# Patient Record
Sex: FEMALE | Race: WHITE | Employment: FULL TIME | URBAN - METROPOLITAN AREA
[De-identification: names, ages, dates, MRNs, and addresses within clinical notes are randomized per-mention and may not be internally consistent; named-entity substitution may affect disease eponyms.]

---

## 2023-03-01 PROBLEM — E66.811 CLASS 1 OBESITY WITH BODY MASS INDEX (BMI) OF 34.0 TO 34.9 IN ADULT: Status: ACTIVE | Noted: 2023-03-01

## 2023-03-01 PROBLEM — F32.A ANXIETY AND DEPRESSION: Status: ACTIVE | Noted: 2023-03-01

## 2023-03-01 PROBLEM — E66.9 OBESITY: Status: ACTIVE | Noted: 2023-03-01

## 2023-03-01 PROBLEM — R06.83 SNORING: Status: ACTIVE | Noted: 2023-03-01

## 2023-03-01 PROBLEM — G47.30 MILD SLEEP APNEA: Status: ACTIVE | Noted: 2023-03-01

## 2023-03-01 PROBLEM — F41.9 ANXIETY AND DEPRESSION: Status: ACTIVE | Noted: 2023-03-01

## 2023-03-01 PROBLEM — R11.0 NAUSEA IN ADULT: Status: ACTIVE | Noted: 2023-03-01

## 2023-03-01 RX ORDER — FLUOXETINE HYDROCHLORIDE 20 MG/1
3 CAPSULE ORAL DAILY
COMMUNITY
Start: 2021-03-03 | End: 2023-10-13

## 2023-03-01 RX ORDER — LEVONORGESTREL 52 MG/1
INTRAUTERINE DEVICE INTRAUTERINE
COMMUNITY

## 2023-03-01 RX ORDER — BUPROPION HYDROCHLORIDE 300 MG/1
1 TABLET ORAL DAILY
COMMUNITY
Start: 2021-03-03 | End: 2023-10-13

## 2023-03-01 RX ORDER — ONDANSETRON 8 MG/1
8 TABLET, ORALLY DISINTEGRATING ORAL 3 TIMES DAILY PRN
COMMUNITY
Start: 2022-11-15 | End: 2024-03-04 | Stop reason: ALTCHOICE

## 2023-03-01 RX ORDER — SEMAGLUTIDE 1.34 MG/ML
1 INJECTION, SOLUTION SUBCUTANEOUS
COMMUNITY
End: 2023-04-10 | Stop reason: SDUPTHER

## 2023-03-31 ENCOUNTER — OFFICE VISIT (OUTPATIENT)
Dept: PRIMARY CARE | Facility: CLINIC | Age: 28
End: 2023-03-31
Payer: COMMERCIAL

## 2023-03-31 VITALS — HEIGHT: 68 IN | BODY MASS INDEX: 32.86 KG/M2 | WEIGHT: 216.8 LBS

## 2023-03-31 DIAGNOSIS — F41.9 ANXIETY AND DEPRESSION: Primary | Chronic | ICD-10-CM

## 2023-03-31 DIAGNOSIS — F32.A ANXIETY AND DEPRESSION: Primary | Chronic | ICD-10-CM

## 2023-03-31 PROBLEM — R06.83 SNORING: Status: RESOLVED | Noted: 2023-03-01 | Resolved: 2023-03-31

## 2023-03-31 PROCEDURE — 99213 OFFICE O/P EST LOW 20 MIN: CPT | Performed by: NURSE PRACTITIONER

## 2023-03-31 PROCEDURE — 1036F TOBACCO NON-USER: CPT | Performed by: NURSE PRACTITIONER

## 2023-03-31 PROCEDURE — 3008F BODY MASS INDEX DOCD: CPT | Performed by: NURSE PRACTITIONER

## 2023-03-31 RX ORDER — DOCUSATE SODIUM 100 MG/1
CAPSULE, LIQUID FILLED ORAL
COMMUNITY
Start: 2023-03-17

## 2023-03-31 ASSESSMENT — PAIN SCALES - GENERAL: PAINLEVEL: 0-NO PAIN

## 2023-03-31 ASSESSMENT — PATIENT HEALTH QUESTIONNAIRE - PHQ9
2. FEELING DOWN, DEPRESSED OR HOPELESS: NOT AT ALL
SUM OF ALL RESPONSES TO PHQ9 QUESTIONS 1 AND 2: 0
1. LITTLE INTEREST OR PLEASURE IN DOING THINGS: NOT AT ALL

## 2023-03-31 NOTE — PROGRESS NOTES
"Subjective   Patient ID: Lo Daniels is a 28 y.o. female who presents for Follow-up (Ozempic followup).    Presents for weight check and follow up.  Has been tolerating Ozempic 1mg weekly well. Only occasional nausea. Zofran helps when this occurs.  Is happy with gradual weight loss.  Has noticed the loss of the \"what will I eat next\" thoughts.    Mood has been good. Has been doing better with taking fluoxetine and bupropion daily.          Review of Systems    Objective   Ht 1.727 m (5' 8\")   Wt 98.3 kg (216 lb 12.8 oz)   BMI 32.96 kg/m²     Physical Exam  Vitals and nursing note reviewed.   Constitutional:       General: She is not in acute distress.     Appearance: Normal appearance.   Cardiovascular:      Rate and Rhythm: Normal rate and regular rhythm.      Heart sounds: Normal heart sounds. No murmur heard.  Pulmonary:      Effort: Pulmonary effort is normal. No respiratory distress.      Breath sounds: Normal breath sounds.   Psychiatric:         Mood and Affect: Mood normal.         Judgment: Judgment normal.         Assessment/Plan   Diagnoses and all orders for this visit:  Anxiety and depression  -     Follow Up In [facility] - PCP; Future  Adult BMI 32.0-32.9 kg/sq m         "

## 2023-03-31 NOTE — PATIENT INSTRUCTIONS
Ozempic working well, tolerating well.  Continue at 1mg weekly.  Continue as needed zofran for nausea.    Anxiety/depression: Continue fluoxetine daily.    Will reevaluate in 6 months at next visit.  Plan for PAP at that time.

## 2023-04-10 DIAGNOSIS — E66.09 CLASS 1 OBESITY DUE TO EXCESS CALORIES WITHOUT SERIOUS COMORBIDITY WITH BODY MASS INDEX (BMI) OF 34.0 TO 34.9 IN ADULT: Primary | ICD-10-CM

## 2023-04-10 RX ORDER — SEMAGLUTIDE 1.34 MG/ML
1 INJECTION, SOLUTION SUBCUTANEOUS
Qty: 3 ML | Refills: 5 | Status: SHIPPED | OUTPATIENT
Start: 2023-04-10 | End: 2023-11-13 | Stop reason: SDUPTHER

## 2023-09-28 ENCOUNTER — APPOINTMENT (OUTPATIENT)
Dept: PRIMARY CARE | Facility: CLINIC | Age: 28
End: 2023-09-28
Payer: COMMERCIAL

## 2023-10-11 ENCOUNTER — APPOINTMENT (OUTPATIENT)
Dept: PRIMARY CARE | Facility: CLINIC | Age: 28
End: 2023-10-11
Payer: COMMERCIAL

## 2023-10-11 NOTE — PROGRESS NOTES
Subjective   Lo Daniels is a 28 y.o. female here for a routine exam. Current complaints: ***. Personal health questionnaire reviewed: {yes/no:9010}.     Gynecologic History  No LMP recorded.  Contraception: {method:5051}  Last Pap: ***. Results were: {norm/abn:77211}  Last mammogram: NA. Results were:  NA    Obstetric History  OB History   No obstetric history on file.       Objective   Physical Exam  Vitals and nursing note reviewed.   Constitutional:       General: She is not in acute distress.     Appearance: Normal appearance. She is obese.   Cardiovascular:      Rate and Rhythm: Normal rate and regular rhythm.      Pulses: Normal pulses.      Heart sounds: Normal heart sounds. No murmur heard.  Pulmonary:      Effort: Pulmonary effort is normal. No respiratory distress.      Breath sounds: Normal breath sounds.   Genitourinary:     Exam position: Lithotomy position.      Pubic Area: No rash.       Vagina: Normal.      Cervix: Normal.      Uterus: Normal.       Adnexa: Right adnexa normal.        Left: Mass present.           Assessment/Plan   Healthy female exam.    {plan:19193}

## 2023-10-13 DIAGNOSIS — F41.9 ANXIETY AND DEPRESSION: Primary | ICD-10-CM

## 2023-10-13 DIAGNOSIS — F32.A ANXIETY AND DEPRESSION: Primary | ICD-10-CM

## 2023-10-13 RX ORDER — FLUOXETINE HYDROCHLORIDE 20 MG/1
60 CAPSULE ORAL DAILY
Qty: 270 CAPSULE | Refills: 0 | Status: SHIPPED | OUTPATIENT
Start: 2023-10-13 | End: 2024-01-15

## 2023-10-13 RX ORDER — BUPROPION HYDROCHLORIDE 300 MG/1
300 TABLET ORAL DAILY
Qty: 90 TABLET | Refills: 0 | Status: SHIPPED | OUTPATIENT
Start: 2023-10-13 | End: 2024-01-15

## 2023-11-12 ASSESSMENT — ENCOUNTER SYMPTOMS
MYALGIAS: 0
SHORTNESS OF BREATH: 0
DYSURIA: 0
FATIGUE: 0
COUGH: 0
NAUSEA: 0
HEADACHES: 0
VOMITING: 0
BLOOD IN STOOL: 0
ABDOMINAL PAIN: 0
CONSTIPATION: 0
APPETITE CHANGE: 0
DIARRHEA: 0
FREQUENCY: 0
BACK PAIN: 0
ARTHRALGIAS: 0
UNEXPECTED WEIGHT CHANGE: 0
PALPITATIONS: 0

## 2023-11-13 ENCOUNTER — LAB (OUTPATIENT)
Dept: LAB | Facility: LAB | Age: 28
End: 2023-11-13
Payer: COMMERCIAL

## 2023-11-13 ENCOUNTER — OFFICE VISIT (OUTPATIENT)
Dept: PRIMARY CARE | Facility: CLINIC | Age: 28
End: 2023-11-13
Payer: COMMERCIAL

## 2023-11-13 VITALS
BODY MASS INDEX: 32.49 KG/M2 | TEMPERATURE: 97.8 F | WEIGHT: 214.4 LBS | HEIGHT: 68 IN | OXYGEN SATURATION: 98 % | SYSTOLIC BLOOD PRESSURE: 110 MMHG | HEART RATE: 92 BPM | DIASTOLIC BLOOD PRESSURE: 68 MMHG

## 2023-11-13 DIAGNOSIS — Z12.4 PAP SMEAR FOR CERVICAL CANCER SCREENING: ICD-10-CM

## 2023-11-13 DIAGNOSIS — Z11.3 SCREEN FOR STD (SEXUALLY TRANSMITTED DISEASE): ICD-10-CM

## 2023-11-13 DIAGNOSIS — F41.9 ANXIETY AND DEPRESSION: Chronic | ICD-10-CM

## 2023-11-13 DIAGNOSIS — Z01.419 ENCOUNTER FOR GYNECOLOGICAL EXAMINATION WITHOUT ABNORMAL FINDING: Primary | ICD-10-CM

## 2023-11-13 DIAGNOSIS — E66.09 CLASS 1 OBESITY DUE TO EXCESS CALORIES WITHOUT SERIOUS COMORBIDITY WITH BODY MASS INDEX (BMI) OF 32.0 TO 32.9 IN ADULT: ICD-10-CM

## 2023-11-13 DIAGNOSIS — F32.A ANXIETY AND DEPRESSION: Chronic | ICD-10-CM

## 2023-11-13 LAB
ANION GAP SERPL CALC-SCNC: 12 MMOL/L (ref 10–20)
BUN SERPL-MCNC: 8 MG/DL (ref 6–23)
CALCIUM SERPL-MCNC: 9.2 MG/DL (ref 8.6–10.6)
CHLORIDE SERPL-SCNC: 104 MMOL/L (ref 98–107)
CHOLEST SERPL-MCNC: 161 MG/DL (ref 0–199)
CHOLESTEROL/HDL RATIO: 2.4
CO2 SERPL-SCNC: 26 MMOL/L (ref 21–32)
CREAT SERPL-MCNC: 0.78 MG/DL (ref 0.5–1.05)
EST. AVERAGE GLUCOSE BLD GHB EST-MCNC: 71 MG/DL
GFR SERPL CREATININE-BSD FRML MDRD: >90 ML/MIN/1.73M*2
GLUCOSE SERPL-MCNC: 80 MG/DL (ref 74–99)
HBA1C MFR BLD: 4.1 %
HDLC SERPL-MCNC: 68.1 MG/DL
HIV 1+2 AB+HIV1 P24 AG SERPL QL IA: NONREACTIVE
LDLC SERPL CALC-MCNC: 83 MG/DL
NON HDL CHOLESTEROL: 93 MG/DL (ref 0–149)
POTASSIUM SERPL-SCNC: 4.1 MMOL/L (ref 3.5–5.3)
SODIUM SERPL-SCNC: 138 MMOL/L (ref 136–145)
T PALLIDUM AB SER QL: NONREACTIVE
TRIGL SERPL-MCNC: 48 MG/DL (ref 0–149)
TSH SERPL-ACNC: 2.66 MIU/L (ref 0.44–3.98)
VLDL: 10 MG/DL (ref 0–40)

## 2023-11-13 PROCEDURE — 80048 BASIC METABOLIC PNL TOTAL CA: CPT

## 2023-11-13 PROCEDURE — 87624 HPV HI-RISK TYP POOLED RSLT: CPT

## 2023-11-13 PROCEDURE — 86780 TREPONEMA PALLIDUM: CPT

## 2023-11-13 PROCEDURE — 87661 TRICHOMONAS VAGINALIS AMPLIF: CPT

## 2023-11-13 PROCEDURE — 84443 ASSAY THYROID STIM HORMONE: CPT

## 2023-11-13 PROCEDURE — 3008F BODY MASS INDEX DOCD: CPT | Performed by: NURSE PRACTITIONER

## 2023-11-13 PROCEDURE — 99395 PREV VISIT EST AGE 18-39: CPT | Performed by: NURSE PRACTITIONER

## 2023-11-13 PROCEDURE — 80061 LIPID PANEL: CPT

## 2023-11-13 PROCEDURE — 83036 HEMOGLOBIN GLYCOSYLATED A1C: CPT

## 2023-11-13 PROCEDURE — 1036F TOBACCO NON-USER: CPT | Performed by: NURSE PRACTITIONER

## 2023-11-13 PROCEDURE — 36415 COLL VENOUS BLD VENIPUNCTURE: CPT

## 2023-11-13 PROCEDURE — 87800 DETECT AGNT MULT DNA DIREC: CPT

## 2023-11-13 PROCEDURE — 88175 CYTOPATH C/V AUTO FLUID REDO: CPT

## 2023-11-13 PROCEDURE — 87389 HIV-1 AG W/HIV-1&-2 AB AG IA: CPT

## 2023-11-13 RX ORDER — SEMAGLUTIDE 1.34 MG/ML
1 INJECTION, SOLUTION SUBCUTANEOUS
Qty: 3 ML | Refills: 5 | Status: SHIPPED | OUTPATIENT
Start: 2023-11-13 | End: 2024-03-04

## 2023-11-13 ASSESSMENT — ENCOUNTER SYMPTOMS
OCCASIONAL FEELINGS OF UNSTEADINESS: 0
DEPRESSION: 0
LOSS OF SENSATION IN FEET: 0

## 2023-11-13 ASSESSMENT — PATIENT HEALTH QUESTIONNAIRE - PHQ9
10. IF YOU CHECKED OFF ANY PROBLEMS, HOW DIFFICULT HAVE THESE PROBLEMS MADE IT FOR YOU TO DO YOUR WORK, TAKE CARE OF THINGS AT HOME, OR GET ALONG WITH OTHER PEOPLE: SOMEWHAT DIFFICULT
2. FEELING DOWN, DEPRESSED OR HOPELESS: SEVERAL DAYS
1. LITTLE INTEREST OR PLEASURE IN DOING THINGS: SEVERAL DAYS
SUM OF ALL RESPONSES TO PHQ9 QUESTIONS 1 AND 2: 2

## 2023-11-13 ASSESSMENT — PAIN SCALES - GENERAL: PAINLEVEL: 0-NO PAIN

## 2023-11-13 NOTE — PROGRESS NOTES
Subjective   Lo Daniels is a 28 y.o. female and is here for a comprehensive physical exam. The patient reports no problems.      History:  LMP: No LMP recorded (lmp unknown).  years ago after IUD  Menopause at NA years  Last pap date: years ago  Abnormal pap? no   : 0  Para:   STD screening asymptomatic.    Do you have pain that bothers you in your daily life? no    Review of Systems   Constitutional:  Negative for appetite change, fatigue and unexpected weight change.   HENT:  Negative for congestion, ear pain and hearing loss.    Eyes:  Negative for visual disturbance.   Respiratory:  Negative for cough and shortness of breath.    Cardiovascular:  Negative for chest pain, palpitations and leg swelling.   Gastrointestinal:  Negative for abdominal pain, blood in stool, constipation, diarrhea, nausea and vomiting.   Genitourinary:  Negative for dysuria, frequency and urgency.   Musculoskeletal:  Negative for arthralgias, back pain and myalgias.   Skin:  Negative for rash.   Neurological:  Negative for syncope and headaches.   Psychiatric/Behavioral:  The patient is nervous/anxious.         Objective   Physical Exam  Vitals and nursing note reviewed.   Constitutional:       General: She is not in acute distress.     Appearance: Normal appearance. She is not toxic-appearing.   HENT:      Head: Normocephalic.      Right Ear: Tympanic membrane, ear canal and external ear normal.      Left Ear: Tympanic membrane, ear canal and external ear normal.      Nose: Nose normal.      Mouth/Throat:      Mouth: Mucous membranes are dry.      Pharynx: Oropharynx is clear.   Eyes:      Conjunctiva/sclera: Conjunctivae normal.   Neck:      Thyroid: No thyromegaly.   Cardiovascular:      Rate and Rhythm: Normal rate and regular rhythm.      Pulses: Normal pulses.      Heart sounds: Normal heart sounds. No murmur heard.  Pulmonary:      Effort: Pulmonary effort is normal. No respiratory distress.      Breath sounds: Normal  breath sounds.   Abdominal:      General: Bowel sounds are normal.      Palpations: Abdomen is soft.      Tenderness: There is no abdominal tenderness.   Genitourinary:     General: Normal vulva.      Exam position: Lithotomy position.      Pubic Area: No rash.       Vagina: Normal. No vaginal discharge.      Cervix: Normal.      Uterus: Normal.       Adnexa: Right adnexa normal and left adnexa normal.   Musculoskeletal:         General: Normal range of motion.      Cervical back: Neck supple.      Right lower leg: No edema.      Left lower leg: No edema.   Lymphadenopathy:      Cervical: No cervical adenopathy.   Skin:     General: Skin is warm and dry.      Capillary Refill: Capillary refill takes less than 2 seconds.      Findings: No rash.   Neurological:      General: No focal deficit present.      Gait: Gait normal.   Psychiatric:         Mood and Affect: Mood normal.         Judgment: Judgment normal.         Assessment/Plan   Healthy female exam.      1. PAP smear today. If normal, repeat in 3-5 years  2. Patient Counseling:  --Nutrition: Stressed importance of moderation in sodium/caffeine intake, saturated fat and cholesterol, caloric balance, sufficient intake of fresh fruits, vegetables, fiber, calcium, iron, and 1 mg of folate supplement per day (for females capable of pregnancy).  --Discussed the issue of estrogen replacement, calcium supplement, and the daily use of baby aspirin.  --Exercise: Stressed the importance of regular exercise.   --Substance Abuse: Discussed cessation/primary prevention of tobacco, alcohol, or other drug use; driving or other dangerous activities under the influence; availability of treatment for abuse.    --Sexuality: Discussed sexually transmitted diseases, partner selection, use of condoms, avoidance of unintended pregnancy  and contraceptive alternatives.   --Injury prevention: Discussed safety belts, safety helmets, smoke detector, smoking near bedding or upholstery.    --Dental health: Discussed importance of regular tooth brushing, flossing, and dental visits.  --Immunizations reviewed.  --Discussed benefits of screening colonoscopy.  --After hours service discussed with patient  3. Discussed the patient's BMI with her.  The BMI is above average. The patient received Provided instructions on dietary changes  Provided instructions on exercise  Advised to Increase physical activity  Ozempic because they have an above normal BMI.  4. Follow up in 6 months

## 2023-11-13 NOTE — PATIENT INSTRUCTIONS
Fasting blood work today.    PAP smear in office today.  If normal, repeat in 3-5 years.    Continie Ozempic weekly.  Continue to work on weight loss with increasing aerobic exercise and following a healthy, portion controlled diet.

## 2023-11-16 LAB
C TRACH RRNA SPEC QL NAA+PROBE: NEGATIVE
N GONORRHOEA DNA SPEC QL PROBE+SIG AMP: NEGATIVE
T VAGINALIS RRNA SPEC QL NAA+PROBE: NEGATIVE

## 2023-12-01 ASSESSMENT — ENCOUNTER SYMPTOMS: NERVOUS/ANXIOUS: 1

## 2023-12-06 LAB
CYTOLOGY CMNT CVX/VAG CYTO-IMP: NORMAL
HPV HR 12 DNA GENITAL QL NAA+PROBE: NEGATIVE
HPV HR GENOTYPES PNL CVX NAA+PROBE: NEGATIVE
HPV16 DNA SPEC QL NAA+PROBE: NEGATIVE
HPV18 DNA SPEC QL NAA+PROBE: NEGATIVE
LAB AP CONTRACEPTIVE HISTORY: NORMAL
LAB AP HPV GENOTYPE QUESTION: YES
LAB AP HPV HR: NORMAL
LAB AP PAP ADDITIONAL TESTS: NORMAL
LABORATORY COMMENT REPORT: NORMAL
PATH REPORT.TOTAL CANCER: NORMAL

## 2023-12-11 DIAGNOSIS — Z01.419 ENCOUNTER FOR GYNECOLOGICAL EXAMINATION WITHOUT ABNORMAL FINDING: Primary | ICD-10-CM

## 2023-12-18 ENCOUNTER — CONSULT (OUTPATIENT)
Dept: ENDOCRINOLOGY | Facility: CLINIC | Age: 28
End: 2023-12-18
Payer: COMMERCIAL

## 2023-12-18 ENCOUNTER — LAB (OUTPATIENT)
Dept: LAB | Facility: LAB | Age: 28
End: 2023-12-18
Payer: COMMERCIAL

## 2023-12-18 VITALS
HEIGHT: 68 IN | SYSTOLIC BLOOD PRESSURE: 110 MMHG | BODY MASS INDEX: 33.49 KG/M2 | DIASTOLIC BLOOD PRESSURE: 71 MMHG | WEIGHT: 221 LBS | HEART RATE: 76 BPM

## 2023-12-18 DIAGNOSIS — N91.5 OLIGOMENORRHEA, UNSPECIFIED TYPE: ICD-10-CM

## 2023-12-18 DIAGNOSIS — Z01.419 ENCOUNTER FOR GYNECOLOGICAL EXAMINATION WITHOUT ABNORMAL FINDING: ICD-10-CM

## 2023-12-18 DIAGNOSIS — Z11.3 SCREENING FOR STDS (SEXUALLY TRANSMITTED DISEASES): ICD-10-CM

## 2023-12-18 DIAGNOSIS — Z01.83 ENCOUNTER FOR RH BLOOD TYPING: ICD-10-CM

## 2023-12-18 DIAGNOSIS — Z31.41 FERTILITY TESTING: Primary | ICD-10-CM

## 2023-12-18 DIAGNOSIS — Z31.41 FERTILITY TESTING: ICD-10-CM

## 2023-12-18 LAB
ABO GROUP (TYPE) IN BLOOD: NORMAL
ANTIBODY SCREEN: NORMAL
DHEA-S SERPL-MCNC: 181 UG/DL (ref 65–395)
HBV SURFACE AG SERPL QL IA: NONREACTIVE
HCV AB SER QL: NONREACTIVE
PROGEST SERPL-MCNC: 1.4 NG/ML
PROLACTIN SERPL-MCNC: 10.7 UG/L (ref 3–20)
RH FACTOR (ANTIGEN D): NORMAL

## 2023-12-18 PROCEDURE — 84402 ASSAY OF FREE TESTOSTERONE: CPT

## 2023-12-18 PROCEDURE — 86901 BLOOD TYPING SEROLOGIC RH(D): CPT

## 2023-12-18 PROCEDURE — 86803 HEPATITIS C AB TEST: CPT

## 2023-12-18 PROCEDURE — 36415 COLL VENOUS BLD VENIPUNCTURE: CPT

## 2023-12-18 PROCEDURE — 84144 ASSAY OF PROGESTERONE: CPT

## 2023-12-18 PROCEDURE — 86900 BLOOD TYPING SEROLOGIC ABO: CPT

## 2023-12-18 PROCEDURE — 86850 RBC ANTIBODY SCREEN: CPT

## 2023-12-18 PROCEDURE — 83516 IMMUNOASSAY NONANTIBODY: CPT

## 2023-12-18 PROCEDURE — 84146 ASSAY OF PROLACTIN: CPT

## 2023-12-18 PROCEDURE — 99214 OFFICE O/P EST MOD 30 MIN: CPT | Performed by: NURSE PRACTITIONER

## 2023-12-18 PROCEDURE — 87340 HEPATITIS B SURFACE AG IA: CPT

## 2023-12-18 PROCEDURE — 83498 ASY HYDROXYPROGESTERONE 17-D: CPT

## 2023-12-18 PROCEDURE — 82627 DEHYDROEPIANDROSTERONE: CPT

## 2023-12-18 PROCEDURE — 99204 OFFICE O/P NEW MOD 45 MIN: CPT | Performed by: NURSE PRACTITIONER

## 2023-12-18 ASSESSMENT — LIFESTYLE VARIABLES
TOBACCO_USE: NO
HISTORY_ALCOHOL_USE: YES

## 2023-12-18 ASSESSMENT — PAIN SCALES - GENERAL: PAINLEVEL: 0-NO PAIN

## 2023-12-18 NOTE — PROGRESS NOTES
In Person    NEW FERTILITY PATIENT VISIT    Referred by:  PCP Elsa Lou  Accompanied today by:  self    Lo Daniels is a 28 y.o. G0 female who presents with Please tell us your reason for this visit today: Fertility preservation      PRIOR EVALUATION / TREATMENT  none  Prior Labs  Lab Results    Date Done      AMH: No results found for requested labs within last 1825 days. No results found for requested labs within last 1825 days.   TSH: 2.66 (Ref range: 0.44 - 3.98 mIU/L) 11/13/2023   PRL: No results found for requested labs within last 1825 days. No results found for requested labs within last 1825 days.   Testosterone: No results found for requested labs within last 1825 days. No results found for requested labs within last 1825 days.   DHEAS: No results found for requested labs within last 1825 days. No results found for requested labs within last 1825 days.   FSH: No results found for requested labs within last 1825 days. No results found for requested labs within last 1825 days.   17 OHP: No results found for requested labs within last 1825 days. No results found for requested labs within last 1825 days.   HgbA1c: 4.1 (Ref range: see below %) 11/13/2023   Hepatitis B surface antigen: No results found for requested labs within last 1825 days. No results found for requested labs within last 1825 days.   Hepatitis C antibody: No results found for requested labs within last 1825 days. No results found for requested labs within last 1825 days.   HIV ½ Antigen Antibody screen with reflex: Nonreactive (Ref range: Nonreactive) 11/13/2023   Syphilis screening with reflex: No results found for requested labs within last 1825 days. 11/13/2023   GC: Negative (Ref range: Negative) 11/13/2023   CT: Negative (Ref range: Negative) 11/13/2023   Type and Screen: No results found for requested labs within last 1825 days. No results found for requested labs within last 1825 days.   Rh: No results found for requested labs  within last 1825 days. No results found for requested labs within last 1825 days.   Antibody: No results found for requested labs within last 1825 days. No results found for requested labs within last 1825 days.   Rubella: No results found for requested labs within last 1825 days. No results found for requested labs within last 1825 days.   Varicella: No results found for requested labs within last 1825 days. No results found for requested labs within last 1825 days.   Hemoglobin: No results found for requested labs within last 1825 days. No results found for requested labs within last 1825 days.   Hematocrit: No results found for requested labs within last 1825 days. No results found for requested labs within last 1825 days.   Creatinine: 0.78 (Ref range: 0.50 - 1.05 mg/dL) 11/13/2023   AST:21 (Ref range: 9 - 39 U/L) 6/13/2019   ALT:21 (Ref range: 7 - 45 U/L): 6/13/2019        Relationship Status:   x 2 years      OB Hx  G0     OB History    No obstetric history on file.         GYN HISTORY   Have you ever been diagnosed with a sexually transmitted disease? Have you ever been diagnosed with a sexually transmitted disease?: No    Please select all that are applicable:    Have you ever had Pelvic Inflammatory Disease? Have you ever had Pelvic Inflammatory Disease?: No    Have you had an abnormal PAP smear? Have you had an abnormal PAP smear?: No    Date & Result of last PAP smear: 2023, negative cytology, negative HPV   Have you ever had an abnormal Mammogram? Have you ever had an abnormal mammogram?: No    Date & result of your last mammogram:  no, n/a, no breast concerns, no family history of breast cancer   Do you have pelvic pain? Do you have pelvic pain?: No    How many times per week do you have intercourse?  N/a  Do you have pain with intercourse? Do you have pain with intercourse?: No    Do you use lubricants with intercourse?  N/a  Do you have pain with bowel movements? Do you have pain with bowel  movements?: No              Do you have pain with a full bladder? Do you have pain with a full bladder?: No    MENSTRUAL HISTORY  LMP: When was your last menstrual period?: I do not get periods.  Mirena IUD placed in 2020 (this is second mirena) pt is happy with this birth control     Menarche: If applicable, when was your first occurrence of menstruation?: 2006 (12yo)    Contraception: What (if any) type of birth control do you currently use?: Mirena  cOCP x 1 year, no negative side effects     Cycle length:  hx of very irregular menses, Q2 weeks to 9 months, since mirena no menses at all   Describe your bleeding: Describe your bleeding:: Heavy  Heavy bleeding without mirena, on mirena no menses   Dysmenorrhea: Are your menstrual periods painful?: Yes  CD1-3 low back pain, moderate cramps      ENDOCRINE/INFERTILITY HISTORY  Duration of infertility: If applicable, what is the approximate date you began trying to get pregnant?: Not applicable    Coital Activity/week:  n/a  Nipple Discharge: Do you experience any loss of milk or liquid discharge from the breasts?: No    Vision changes: Are you experiencing any vision changes?: No    Headaches: Are you experiencing headaches?: No  No migraines    Excess hair growth: Are you experiencing persistent or worsening hair growth on the face, breasts or lower abdomen?: Yes  Midline belly button hair and underside of chin, pt reports it is bothersome     Excessive hair loss: Are you experiencing loss of hair from your scalp?: No    Acne: Are you experiencing acne?: No  Used Accutane in 2017 that reset her skin and no issues since  Oily skin: Oily skin?: Yes    Recent weight change  Weight gain: Are you experiencing any increase in weight?: No    Weight loss: Are you experiencing any decrease in weight?: Yes  On ozempic lost 20 lbs over the past year     Exercise more than 3 times a week: Exercise more than 3 times a week?: No      PMH  Depression and Anxiety  Past Medical  History:   Diagnosis Date    Viral wart, unspecified 03/31/2022    Wart on thumb        MEDICATIONS  Current Outpatient Medications on File Prior to Visit   Medication Sig Dispense Refill    buPROPion XL (Wellbutrin XL) 300 mg 24 hr tablet TAKE 1 TABLET BY MOUTH EVERY DAY 90 tablet 0    docusate sodium (Colace) 100 mg capsule       FLUoxetine (PROzac) 20 mg capsule TAKE 3 CAPSULES BY MOUTH EVERY  capsule 0    levonorgestrel (Mirena) 20 mcg/24 hours (8 yrs) 52 mg IUD Mirena (52 MG) IUD   Refills: 0       Active      ondansetron ODT (Zofran-ODT) 8 mg disintegrating tablet 1 tablet (8 mg) 3 times a day as needed for nausea. on the tongue      semaglutide (Ozempic) 1 mg/dose (4 mg/3 mL) pen injector Inject 1 mg under the skin 1 (one) time per week. 3 mL 5     No current facility-administered medications on file prior to visit.        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 Have you ever been incarcerated?: No    Do you have a history of domestic violence? Do you have a history of domestic violence?: No    Do you feel safe at home? Do you feel safe at home?: Yes    Do you have a history of any negative sexual experience such as incest or rape? Do you have a history of any negative sexual experience such as incest or rape?: Yes  No difficulty with speculum exams      PARTNER HISTORY  Partner Name: Partner name:: Venu  Barrington    : Partner :: 89    Occupation: Partner occupation::     Prior fertility history: Partner Prior Fertility History:: No children, never attempted pregnancy    PMH: Partner Past Medical History:: Depression/anxiety/ADHD    PSH: Partner Past Surgical History:: None    Smoking:Partner: Recent or current tobacco use: No    Alcohol Use: Partner: Recent or current alcohol use: No    Drug Use: Partner: Recent or current drug use: No    Medications: Adderral   Injuries: Partner: Any history of surgeries or injuries in the reproductive area?: No    STD: Have you ever been diagnosed with a sexually transmitted disease?: No    Please select all that are applicable:    SA: Prior Semen Analysis completed?: No    SA Results:  n/a    FAMILY HISTORY  Family History   Problem Relation Name Age of Onset    Depression Mother      Depression Maternal Grandmother      Kidney cancer Maternal Grandmother          renal cell carcinoma    Diabetes Maternal Grandfather      Hypertension Maternal Grandfather      Depression Paternal Grandmother          bipolar    Multiple sclerosis Paternal Grandmother         CANCER HISTORY    Breast: Breast Cancer: Please answer Yes, No or Unsure. If Yes, please, identify which family member.: No    Ovarian: Ovarian Cancer: Please answer Yes, No or Unsure. If Yes, please, identify which family member.: No    Colon: Colon Cancer: Please answer Yes, No or Unsure. If Yes, please, identify which family member.: No    Endometrial: Endometrial Cancer: Please answer Yes, No or Unsure. If Yes, please, identify which family member.: No      FAMILY VTE HISTORY  Family History of Blood Clots: Blood Clots: Please answer Yes, No or Unsure. If Yes, please, identify which family member.: No      GENETIC HISTORY  Ethnic Background  Patient: Ethnic Background Patient::     Partner: Ethnic Background Partner::     Genetic Disease in Family  Patient: Patient: Defects  "and genetic syndromes? Please answer Yes, No or Unsure: No    Partner: Partner: Defects and genetic syndromes? Please answer Yes, No or Unsure: No    Birth Defects in Family  Patient: Patient: Birth defects? Please answer Yes, No or Unsure: No    Partner: Partner: Birth defects? Please answer Yes, No or Unsure: No    Genetic screening performed previously:  no     BMI:   BMI Readings from Last 1 Encounters:   12/18/23 33.60 kg/m²     VITALS:  /71 (BP Location: Right arm, Patient Position: Sitting, BP Cuff Size: Adult)   Pulse 76   Ht 1.727 m (5' 8\")   Wt 100 kg (221 lb)   BMI 33.60 kg/m²     ASSESSMENT   28 y.o. G0 female with a history of oligomenorrhea (has Mirena IUD in place now), currently with clinical hirsutism, suspect PCOS and the following pertinent medical issues: anxiety/depression (well controlled) on Ozempic for weight loss .  Partner SA: NA    COUNSELING  We discussed causes of infertility including hormonal, egg quality issues, structural problems such as endometriosis, adhesions, or tubal problems, uterine factors such as polyps or fibroids, and sperm issues. Reviewed evaluation of such as well. We discussed various methods for achieving pregnancy in some detail including, ovulation induction, insemination, superovulation and IVF.    We discussed causes of irregular menstrual cycles, we discussed work up including blood work and ultrasound.     We discussed options for fertility preservation including oocyte cryopreservation and embryo cryopreservation, and discussed advances in cryopreservation specifically the optimization of vitrification to enhance oocyte freezing and enhance the likelihood of pregnancies after thaw.  Discussed with patient the clinical data that currently exists about efficacy of oocyte cryopreservation as a strategy for fertility preservation and that this is an evolving area. At present several concepts have been demonstrated. Increased maternal age likely impacts " "total egg yield and likelihood of pregnancy after thaw. Offspring outcomes appear to be comparable for children conceived after IVF and oocyte freezing/thaw/IVF but research in this area is evolving and much more outcomes data exists for children conceived after embryo cryo than oocyte cryo. An upper limit on duration of time that oocytes can be frozen and still result in a pregnancy has not been defined. There is no guarantee for a pregnancy with any amount of oocytes cryopreserved. Reviewed nature of stimulation, injectable hormones used, and monitoring process and the process of oocyte retrieval as an outpatient surgical procedure to harvest oocytes. Risks of this procedure include ovarian hyperstimulation syndrome, anesthesia risks during egg retrieval. Reviewed need to consult with financial specialists in our office to delineate coverage and costs.      PLAN  Orders Placed This Encounter   Procedures    US pelvis transvaginal    Antimullerian Hormone (Amh)    Prolactin    Testosterone,Free and Total    Dhea-Sulfate    17-Hydroxyprogesterone    Progesterone    Type And Screen    Hepatitis B surface antigen    Hepatitis C Antibody       GENETIC SCREENING PATIENT  NA    PARTNER  NA  NA    FOLLOW UP   Consults: Financial consult  Chart to primary nurse for care coordination and patient check list/education  Enroll in Engaged MD  Take prenatal vitamins, vitamin D 2000 IUs daily  Discussed that PAP and mammogram must be updated if appropriate based on age and clinical history and results received before treatment can begin  Discussed that treatment cannot proceed until checklist items are complete   6 week follow up with MD and ANNA  IVF CONSULT FOR EGG FREEZING (pt goal with egg freezing is for \"back up\", pt is  and not ready to attempt conception yet, but is aware my have difficulty conceiving r/t history of oligomenorrhea and clinical hirsutism).       Lata Stokes  12/18/2023  8:20 AM    "

## 2023-12-20 LAB — MIS SERPL-MCNC: 2.12 NG/ML (ref 0.4–16.02)

## 2023-12-21 ENCOUNTER — APPOINTMENT (OUTPATIENT)
Dept: ENDOCRINOLOGY | Facility: CLINIC | Age: 28
End: 2023-12-21
Payer: COMMERCIAL

## 2023-12-21 LAB
TESTOSTERONE FREE (CHAN): 89.6 PG/ML (ref 0.1–6.4)
TESTOSTERONE,TOTAL,LC-MS/MS: 878 NG/DL (ref 2–45)

## 2023-12-22 LAB — 17OHP SERPL-MCNC: 111.51 NG/DL

## 2024-01-04 ENCOUNTER — TELEPHONE (OUTPATIENT)
Dept: PRIMARY CARE | Facility: CLINIC | Age: 29
End: 2024-01-04
Payer: COMMERCIAL

## 2024-01-04 DIAGNOSIS — L30.0 NUMMULAR ECZEMA: Primary | ICD-10-CM

## 2024-01-04 RX ORDER — TRIAMCINOLONE ACETONIDE 1 MG/G
1 OINTMENT TOPICAL 2 TIMES DAILY PRN
Qty: 15 G | Refills: 0 | Status: SHIPPED | OUTPATIENT
Start: 2024-01-04 | End: 2024-01-26 | Stop reason: WASHOUT

## 2024-01-08 ENCOUNTER — APPOINTMENT (OUTPATIENT)
Dept: ENDOCRINOLOGY | Facility: CLINIC | Age: 29
End: 2024-01-08
Payer: COMMERCIAL

## 2024-01-08 ENCOUNTER — ANCILLARY PROCEDURE (OUTPATIENT)
Dept: ENDOCRINOLOGY | Facility: CLINIC | Age: 29
End: 2024-01-08
Payer: COMMERCIAL

## 2024-01-08 DIAGNOSIS — Z31.41 FERTILITY TESTING: ICD-10-CM

## 2024-01-08 PROCEDURE — 76856 US EXAM PELVIC COMPLETE: CPT | Performed by: OBSTETRICS & GYNECOLOGY

## 2024-01-08 PROCEDURE — 76830 TRANSVAGINAL US NON-OB: CPT | Performed by: OBSTETRICS & GYNECOLOGY

## 2024-01-08 PROCEDURE — 76830 TRANSVAGINAL US NON-OB: CPT

## 2024-01-09 DIAGNOSIS — R79.89 ELEVATED TESTOSTERONE LEVEL IN FEMALE: Primary | ICD-10-CM

## 2024-01-09 NOTE — PROGRESS NOTES
Called pt discussed ultrasound results from Yesterday 1/8/2024. Ultrasound concerning for multiple cysts in Right ovary largest ovarian cyst measuring approximately 6 cm. Pt also with abnormally elevated testosterone level.   Testosterone, Free  0.1 - 6.4 pg/mL 89.6 High    Comment:    This test was developed and its analytical performance  characteristics have been determined by "[employer], [place]. It has  not been cleared or approved by the U.S. Food and Drug  Administration. This assay has been validated pursuant  to the CLIA regulations and is used for clinical  purposes.          Testosterone, Total, LC-MS/MS  2 - 45 ng/dL 878 High      Reviewed with Dr. Nikki Meredith. 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 Recommend repeat testosterone level and follow up with GYN Oncology for evaluation/treatment. Message sent to Dr. Mamie Mcfadden for referral. Dr. Mcfadden added to tumor board this Friday for review. Will follow up with pt on Friday after we hear back from Dr. Mcfadden and tumor board recommendations.    Reviewed and approved by WAYNE INFANTE on 1/10/24 at 730AM

## 2024-01-10 ENCOUNTER — LAB (OUTPATIENT)
Dept: LAB | Facility: LAB | Age: 29
End: 2024-01-10
Payer: COMMERCIAL

## 2024-01-10 DIAGNOSIS — R79.89 ELEVATED TESTOSTERONE LEVEL IN FEMALE: ICD-10-CM

## 2024-01-10 DIAGNOSIS — N83.209 CYST OF OVARY, UNSPECIFIED LATERALITY: Primary | ICD-10-CM

## 2024-01-10 PROCEDURE — 36415 COLL VENOUS BLD VENIPUNCTURE: CPT

## 2024-01-10 PROCEDURE — 84402 ASSAY OF FREE TESTOSTERONE: CPT

## 2024-01-10 NOTE — TUMOR BOARD NOTE
Gynecologic Oncology Tumor Board 2024    Specialties Present: Gyn Onc, Radiology, Genetics, Radiation oncology, Pathology, Nurse Navigator, Research    Lo Daniels  28 y.o.    1995  MRN [id number]    Provider: Mamie Mcfadden MD  Disease Site: Ovarian  Pathology: N/A  Prior Therapy: N/A  Impression: 27yo with oligomenorrhea, markedly elevated serum total testosterone imaging with suspected benign mass.     We reviewed previous medical and familial history, history of present illness, and recent lab results along with all available histopathologic and imaging studies. The tumor board considered available treatment options and made the following recommendations.    Recommendations:     Repeat ultrasound 8-12 weeks. Follow up repeat hormone testing. Recommend adrenal imaging if persistent elevation. Consider surgical excision if persists.  Clinical Trial Eligibility: Not discussed       National site-specific guidelines were discussed with respect to the case. It is recognized that there may be additional factors not discussed in the Review Board discussion that can influence management decisions, and alternative management options which fall within the standard of care. Accordingly the final treatment disposition will be determined by the patient, in the context of an informed discussion with their providers. The actual prescribed management or treatment plan may or may not be consistent with the Review Board recommendations.

## 2024-01-12 ENCOUNTER — DOCUMENTATION (OUTPATIENT)
Dept: ENDOCRINOLOGY | Facility: CLINIC | Age: 29
End: 2024-01-12

## 2024-01-12 ENCOUNTER — TUMOR BOARD CONFERENCE (OUTPATIENT)
Dept: HEMATOLOGY/ONCOLOGY | Facility: HOSPITAL | Age: 29
End: 2024-01-12
Payer: COMMERCIAL

## 2024-01-12 NOTE — PROGRESS NOTES
Discussed elevated testosterone level (Testosterone, Free  0.1 - 6.4 pg/mL 89.6 High    Testosterone, Total, LC-MS/MS  2 - 45 ng/dL 878 High) with large cyst 6 cm in Right ovary and concern for androgen secreting tumor.  See response from review in GYN Onc tumor board below:       MD Lata Kenyon, BETTINA-MARIA DE JESUS Guido!  We looked at the pictures this morning with radiology and it seems like this looks more just like an ovarian cyst - not really consistent with an androgen secreting tumor.  We would recommend repeating ovarian imaging in 8-12 weeks.  I think if her testosterone is still really elevated, the next steps would be to do more imaging to look for another source of testosterone - adrenal mass, etc.  Let me know if I canhelp!  Mamie      Called pt LVM that GYN/Onc does not feel that cyst looks consistent with androgen secreting tumor. Pt already aware of recommendation to repeat testosterone level.  If repeat is still significantly elevated will plan adrenal imaging as next plan of care.   Reviewed and approved by LATA INFANTE on 1/12/24 at 2:54 PM.

## 2024-01-14 DIAGNOSIS — F32.A ANXIETY AND DEPRESSION: ICD-10-CM

## 2024-01-14 DIAGNOSIS — F41.9 ANXIETY AND DEPRESSION: ICD-10-CM

## 2024-01-14 LAB
TESTOSTERONE FREE (CHAN): 5.8 PG/ML (ref 0.1–6.4)
TESTOSTERONE,TOTAL,LC-MS/MS: 62 NG/DL (ref 2–45)

## 2024-01-15 ENCOUNTER — CONSULT (OUTPATIENT)
Dept: ENDOCRINOLOGY | Facility: CLINIC | Age: 29
End: 2024-01-15
Payer: COMMERCIAL

## 2024-01-15 VITALS
DIASTOLIC BLOOD PRESSURE: 75 MMHG | SYSTOLIC BLOOD PRESSURE: 113 MMHG | WEIGHT: 217 LBS | HEART RATE: 73 BPM | HEIGHT: 68 IN | BODY MASS INDEX: 32.89 KG/M2

## 2024-01-15 DIAGNOSIS — Z31.89 ENCOUNTER FOR FERTILITY PLANNING: ICD-10-CM

## 2024-01-15 DIAGNOSIS — R79.89 ELEVATED TESTOSTERONE LEVEL IN FEMALE: Primary | ICD-10-CM

## 2024-01-15 PROCEDURE — 99215 OFFICE O/P EST HI 40 MIN: CPT | Performed by: NURSE PRACTITIONER

## 2024-01-15 RX ORDER — BUPROPION HYDROCHLORIDE 300 MG/1
300 TABLET ORAL DAILY
Qty: 90 TABLET | Refills: 0 | Status: SHIPPED | OUTPATIENT
Start: 2024-01-15 | End: 2024-05-28

## 2024-01-15 RX ORDER — FLUOXETINE HYDROCHLORIDE 20 MG/1
60 CAPSULE ORAL DAILY
Qty: 270 CAPSULE | Refills: 0 | Status: SHIPPED | OUTPATIENT
Start: 2024-01-15 | End: 2024-05-28

## 2024-01-15 ASSESSMENT — PAIN SCALES - GENERAL: PAINLEVEL: 0-NO PAIN

## 2024-01-15 NOTE — PROGRESS NOTES
In Person    IVF Note    Patient is a 28 y.o. G0  female desires oocyte cryopreservation for age.   Referred by:  Dr. Lou  Here today with: NA  Indication for IVF: Oocyte Banking   AMH: 2.124 (Ref range: 0.401 - 16.015 ng/mL)  Status of fallopian tubes: none, n/a  Saline Ultrasound: none, n/a  Hysteroscopy:  none, n/a  Past Infertility Treatments: None    GYN HISTORY   Dyspareunia/Dyschezia/Dysuria:  No  Pelvic pain: No  STDs: None  HX of abnormal Pap: No  HX of abnormal Mammo: No  LMP: No LMP recorded. Patient has had an implant.  Menstrual cycles: Irregular  Pap smear:   Lab Results   Component Value Date    FINALINTERP  11/13/2023         A. THINPREP PAP CERVIX, SCREENING -     Specimen Adequacy  Satisfactory for evaluation; endocervical/transformation zone component is present    General Categorization  Negative for intraepithelial lesion or malignancy.    Descriptive Interpretation  Negative for intraepithelial lesion or malignancy             HPV: Negative   Mammogram: none, n/a      PMH  Past Medical History:   Diagnosis Date    Viral wart, unspecified 03/31/2022    Wart on thumb     History of any clotting: No  History of hospitalizations or surgeries: No  History of easy bleeding/bruising: No    PSH  Past Surgical History:   Procedure Laterality Date    OTHER SURGICAL HISTORY  07/08/2021    [place] tooth extraction       Genetic screening Hx  Patient: Myriad 2bP:  n/a  Sperm Source: n/a  Sperm: Bid [employer]erd 2bP:  N/a oocyte freezing  N/A    Social history  Social History     Tobacco Use    Smoking status: Never    Smokeless tobacco: Never   Substance Use Topics    Alcohol use: Not Currently    Drug use: Never     Occupation:  physician  Current smoker: No    Family history  Cognitive deficits: No  Birth defects: No  Other:     Current Meds  Current Outpatient Medications   Medication Sig Dispense Refill    buPROPion XL (Wellbutrin XL) 300 mg 24 hr tablet TAKE 1 TABLET BY MOUTH EVERY DAY 90 tablet 0     "docusate sodium (Colace) 100 mg capsule       FLUoxetine (PROzac) 20 mg capsule TAKE 3 CAPSULES BY MOUTH EVERY  capsule 0    levonorgestrel (Mirena) 20 mcg/24 hours (8 yrs) 52 mg IUD Mirena (52 MG) IUD   Refills: 0       Active      ondansetron ODT (Zofran-ODT) 8 mg disintegrating tablet 1 tablet (8 mg) 3 times a day as needed for nausea. on the tongue      semaglutide (Ozempic) 1 mg/dose (4 mg/3 mL) pen injector Inject 1 mg under the skin 1 (one) time per week. 3 mL 5    triamcinolone (Kenalog) 0.1 % ointment Apply 1 Application topically 2 times a day as needed for irritation or rash. 15 g 0     No current facility-administered medications for this visit.       Allergies  Amoxicillin    Partner History  Name: n/a    VITALS:  /75 (BP Location: Right arm, Patient Position: Sitting, BP Cuff Size: Adult)   Pulse 73   Ht 1.727 m (5' 8\")   Wt 98.4 kg (217 lb)   BMI 32.99 kg/m²   BMI:   BMI Readings from Last 1 Encounters:   01/15/24 32.99 kg/m²     BMI Testing  [facility]  CBC Within 1 year   CMP Within 1 year   HgbA1c Within 1 year   Mag, Phos, Vit D <18 Within 1 year   MFM > 40  REQ   Wt loss consult > 40 OPT     ASSESSMENT   28 y.o. G0 female desires oocyte banking for age related concern, and the following pertinent medical issues: Probable PCOS given irregular menstrual cycles, clinical hirsutism, and elevated testosterone, also with AMH 2.1.  Has Mirena IUD in place for cycle management   Indication for IVF: Oocyte Banking  Partner SA: NA    We discussed options for fertility preservation including oocyte cryopreservation and embryo cryopreservation, and discussed advances in cryopreservation specifically the optimization of vitrification to enhance oocyte freezing and enhance the likelihood of pregnancies after thaw.  Discussed with patient the clinical data that currently exists about efficacy of oocyte cryopreservation as a strategy for fertility preservation and that this is an " evolving area. At present several concepts have been demonstrated. Increased maternal age likely impacts total egg yield and likelihood of pregnancy after thaw. Offspring outcomes appear to be comparable for children conceived after IVF and oocyte freezing/thaw/IVF but research in this area is evolving and much more outcomes data exists for children conceived after embryo cryo than oocyte cryo. An upper limit on duration of time that oocytes can be frozen and still result in a pregnancy has not been defined. There is no guarantee for a pregnancy with any amount of oocytes cryopreserved. Reviewed nature of stimulation, injectable hormones used, and monitoring process and the process of oocyte retrieval as an outpatient surgical procedure to harvest oocytes. Risks of this procedure include ovarian hyperstimulation syndrome, anesthesia risks during egg retrieval. Reviewed need to consult with financial specialists in our office to delineate coverage and costs.    We reviewed IVF and discussed the following:   In-vitro fertilization and embryo transfer  Stimulation protocols   Oocyte retrieval, risks    Cryopreservation   Assessment of fertilization   Embryo development  Statistics  ICSI/Assisted hatching   Embryo transfer and preparation    Risks of OHSS and multiple gestation   Cancelled cycles   Use of birth control   Selective reduction   Number of embryos to transfer   Ectopic pregnancy  and miscarriage  Team based care  Informed consent procedures  Folic acid supplementation   Genetic carrier screening   PGT  Frozen tissue storage and transport process  Discussed that PAP and mammogram must be updated if appropriate based on age and clinical  history and results received before treatment can begin.    ART Cycle Plan    1. Protocol:  Lead in: OCP  Stimulation protocol: Antagonist -200 HM 75 (dose pending review from Dr. Meredith)  Trigger plan: Lupron and HCG vs Lupron  Pre-retrieval meds: Antibiotics per  protocol  Adjuncts:  none, n/a   Notes:     2. FET:  N/A    3. Insemination:  N/A    4. Transfer:   N/A    5. Cryopreservation plan  PGT: No n/a  Freeze all? Yes  Oocyte cryopreservation: Yes, Freeze mature eggs only    6. Patient willing to accept blood transfusion: Yes    7. RN to review chart, initiate IVF boarding pass, and assure completion of the following prior to proceeding with IVF stimulation:       No orders of the defined types were placed in this encounter.      STDs (Hepatitis B, Hepatitis C, HIV, Syphilis, GC/CT) for patient and partner (if applicable) to be completed within the last year (z11.3)  Genetic carrier testing: waiver or carrier screen completed with clearance documentation by provider for both patient and partner (z13.71)  Rubella and varicella to be completed within the last five years (z11.59)   TSH to be completed within the last year (z13.29)  Type & Screen to be completed within the last year (z01.83)  AMH to be completed within the last year (z31.41)  Pre-IVF Imaging: Reference any orders placed by provider.  Cavity evaluation: NA  Frozen sperm sample: ensure frozen partner sample (z31.41) or verify donor sperm on site prior to stimulation start date.  Verify in EMR or obtain copy of patient’s last mammogram (if applicable) and pap smear results for provider review in boarding pass.  Enroll in Engaged MD and complete annual consent forms for IVF and cryotransport agreements.  BMI checklist for BMI <18 or >40  Consults: Nursing and Financial Consult.  PAT Consult: No  Ectopic Risk: No  Medically Complex: No  Additional consults Financial consult and review what is in the boarding pass.  Timeline: Mid to late March for stimulation  Recommend repeat testosterone prior to IVF     Lata Stokes  01/15/2024  11:29 AM

## 2024-02-20 ENCOUNTER — APPOINTMENT (OUTPATIENT)
Dept: GYNECOLOGIC ONCOLOGY | Facility: CLINIC | Age: 29
End: 2024-02-20
Payer: COMMERCIAL

## 2024-03-04 ENCOUNTER — TELEMEDICINE (OUTPATIENT)
Dept: PRIMARY CARE | Facility: CLINIC | Age: 29
End: 2024-03-04
Payer: COMMERCIAL

## 2024-03-04 ENCOUNTER — TELEPHONE (OUTPATIENT)
Dept: ENDOCRINOLOGY | Facility: CLINIC | Age: 29
End: 2024-03-04

## 2024-03-04 DIAGNOSIS — F32.A ANXIETY AND DEPRESSION: Primary | ICD-10-CM

## 2024-03-04 DIAGNOSIS — R63.5 WEIGHT GAIN: ICD-10-CM

## 2024-03-04 DIAGNOSIS — F41.9 ANXIETY AND DEPRESSION: Primary | ICD-10-CM

## 2024-03-04 PROCEDURE — 1036F TOBACCO NON-USER: CPT | Performed by: NURSE PRACTITIONER

## 2024-03-04 PROCEDURE — 99213 OFFICE O/P EST LOW 20 MIN: CPT | Performed by: NURSE PRACTITIONER

## 2024-03-04 PROCEDURE — 3008F BODY MASS INDEX DOCD: CPT | Performed by: NURSE PRACTITIONER

## 2024-03-04 RX ORDER — BISMUTH SUBSALICYLATE 262 MG
1 TABLET,CHEWABLE ORAL DAILY
COMMUNITY

## 2024-03-04 NOTE — PATIENT INSTRUCTIONS
Mood stable on wellbutrin, fluoxetine  Continue current medications as prescribed    Likely PCOS based on clinical symptoms and recent blood work  Discussed metformin versus spironolactone as treatment options  Continue to watch diet and stay active to prevent weight regain since off Semaglutide.

## 2024-03-04 NOTE — PROGRESS NOTES
Subjective   Patient ID: Lo Daniels is a 28 y.o. female who presents for Obesity and Depression.    Following up on ozempic  Got too expensive, stopped using.  Has been a few months since last used.  Home weight  220 - had been doing better with meal prep, more active  Has had rebound weight gain since then despite lifestyle changes.    Mood has been good with current medications, stress.    Under the care of Reproductive Endocrinology for fertility.   PCOS diagnosis. Is hesitant to take medications since blood sugars normal.  Recommended to try metformin or spironolactone.         Review of Systems    Objective   There were no vitals taken for this visit.    Physical Exam  Constitutional:       General: She is not in acute distress.     Appearance: Normal appearance.   Pulmonary:      Effort: Pulmonary effort is normal.   Psychiatric:         Mood and Affect: Mood normal.         Judgment: Judgment normal.         Assessment/Plan   Diagnoses and all orders for this visit:  Anxiety and depression  Weight gain

## 2024-03-05 DIAGNOSIS — Z31.83 ENCOUNTER FOR ASSISTED REPRODUCTIVE FERTILITY CYCLE: ICD-10-CM

## 2024-03-05 NOTE — PROGRESS NOTES
Boarding Pass IVF Checklist    Age: 29 y.o.  G0  Provider: Lata Stokes CNP  Primary RN: Eun Benites  Reasons for Treatment: Polycystic Ovarian Syndrome and Oocyte Banking  Last BMI  01/15/24 : 32.99 kg/m²       Past Medical History:   Diagnosis Date    Viral wart, unspecified 03/31/2022    Wart on thumb       Date Done Consultation Results/Comments   1/15/2024 Medication Protocol Lead in: OCP (currently has IUD in place)  Stimulation protocol: ANTAGONIST /Menopur 150  (Confirmed RW 3/27/24)  Trigger plan: HCG vs Lupron  Pre-retrieval meds: Antibiotics per protocol  Adjuncts:   Notes: Repeat Testosterone prior to IVF - order placed  Will check E2, P4, LH to determine when to start OCP for lead in to IVF stim    1/15/24 IVF Consult  [x]    PGT-A/M? No   3/26/24 IVF Information and Authorization (to be completed annually) Received and in chart: Yes (Eun Benites RN)   3/26/24  Waiver (Out) Form Received and in chart: Yes (Eun Benites RN)   3.24.24 [employer] Packet [x] /   3/26/24 Procedure Order Placed [x]        MFM Consult Okay to proceed? N/A    Psych Consult Okay to proceed? N/A    Genetics Consult Okay to proceed? N/A    Other    Date Done Female Labs Results/Comments   12/18/2023 T&S (Q 1 Year) Results for orders placed or performed in visit on 12/18/23 (from the past 8760 hour(s))   Type And Screen   Result Value Ref Range    ABO TYPE O     Rh TYPE POS     ANTIBODY SCREEN NEG       12/18/2023 Hep B sAg Nonreactive   12/18/2023 Hep C AB Nonreactive   11/13/2023 HIV Nonreactive   11/13/2023 Syphilis Nonreactive   11/13/2023 GC/CT GC: Negative  CT: Negative   6/8/21 Rubella (Q 5 Years) NEGATIVE   7/8/21 MMR Vaccine rcvd   6/8/21 Varicella (Q 5 Years) POSITIVE   11/13/2023 TSH 2.66 (Ref range: 0.44 - 3.98 mIU/L)   11/13/2023 HgbA1C 4.1 (Ref range: see below %)   12/18/2023 AMH 2.124   N/A Carrier Screening Myriad 2bP:   Declined and Authorization completed      Uterine Cavity Eval Pelvic US:  1/8/2024  Impression   IUCD placed correctly at the fundus of the uterus. The right ovary contains two large cysts with possible internal debris at hilum and thick walls noted.  Follow-up  Given elevated testosterone and presence of larger ovarian cysts, will refer to GYN oncology.    HSG:   SIS:   Hyster:   N/A Trial Transfer Needs at retrieval?   Easy IUIs:    11/13/2023 Pap Smear Negative for intraepithelial lesion or malignancy.    N/A Mammogram ( > 40)              Date Done Male Labs   Results/Comments      Hep B sAg     Hep C AB      HIV     Syphilis     GC/CT     Carrier Screening     Semen Analysis      Sperm Freeze     Date Done Miscellaneous Results/Comments   N/A BMI Checklist  BMI > 40 or < 18    N/A >= 45 Checklist     **Does not need to be completed prior to placing on IVF calendar**    MD Completion:  PAT needed: No  Ectopic Risk: No  Medically Complex: No    Will check E2, P4, LH to determine when to start OCP for lead in to IVF stim   Reviewed and approved by TOM GAMA on 3/27/24 at 3:32 PM.

## 2024-03-05 NOTE — TELEPHONE ENCOUNTER
Returned patient's call, no answer,  left that would send a MC message noting items to be done to proceed with IVF.  MC message sent with list of checklist items to be completed, BP started.  Eun Benites 03/04/2024 9:48 AM

## 2024-03-05 NOTE — PROGRESS NOTES
Boarding Pass IVF Checklist    Age: 29 y.o.  G0  Provider: Lata Stokes CNP  Primary RN: Eun Benites  Reasons for Treatment: Polycystic Ovarian Syndrome and Oocyte Banking  Last BMI  01/15/24 : 32.99 kg/m²       Past Medical History:   Diagnosis Date    Viral wart, unspecified 03/31/2022    Wart on thumb       Date Done Consultation Results/Comments   1/15/2024 Medication Protocol Lead in: OCP (currently has IUD in place)  Stimulation protocol: ANTAGONIST /Menopur 150  (Confirmed RW 3/27/24)  Trigger plan: HCG vs Lupron  Pre-retrieval meds: Antibiotics per protocol  Adjuncts:   Notes: Repeat Testosterone prior to IVF - order placed  Will check E2, P4, LH to determine when to start OCP for lead in to IVF stim     5/28/24 per Dr. Meredith:  I would recommend similar cycle with the following dosing:   OCP Antagonist /Menopur 150    1/15/24 IVF Consult  [x]    PGT-A/M? No   3/26/24 IVF Information and Authorization (to be completed annually) Received and in chart: Yes (Eun Benites, RN)   3/26/24    New one assigned 5/31/24  Waiver (Out) Form Received and in chart: Yes (Eun Benites RN)   3.24.24 [employer] Packet [x] /   3/26/24 Procedure Order Placed [x]        MFM Consult Okay to proceed? N/A    Psych Consult Okay to proceed? N/A    Genetics Consult Okay to proceed? N/A    Other    Date Done Female Labs Results/Comments   12/18/2023 T&S (Q 1 Year) Results for orders placed or performed in visit on 12/18/23 (from the past 8760 hour(s))   Type And Screen   Result Value Ref Range    ABO TYPE O     Rh TYPE POS     ANTIBODY SCREEN NEG       12/18/2023 Hep B sAg Nonreactive   12/18/2023 Hep C AB Nonreactive   11/13/2023 HIV Nonreactive   11/13/2023 Syphilis Nonreactive   11/13/2023 GC/CT GC: Negative  CT: Negative   6/8/21 Rubella (Q 5 Years) NEGATIVE   7/8/21 MMR Vaccine rcvd   6/8/21 Varicella (Q 5 Years) POSITIVE   11/13/2023 TSH 2.66 (Ref range: 0.44 - 3.98 mIU/L)   11/13/2023 HgbA1C 4.1 (Ref  range: see below %)   12/18/2023 AMH 2.124   N/A Carrier Screening Myriad 2bP:   Declined and Authorization completed      Uterine Cavity Eval Pelvic US: 1/8/2024  Impression   IUCD placed correctly at the fundus of the uterus. The right ovary contains two large cysts with possible internal debris at hilum and thick walls noted.  Follow-up  Given elevated testosterone and presence of larger ovarian cysts, will refer to GYN oncology.    HSG:   SIS:   Hyster:   N/A Trial Transfer Needs at retrieval?   Easy IUIs:    11/13/2023 Pap Smear Negative for intraepithelial lesion or malignancy.    N/A Mammogram ( > 40)              Date Done Male Labs   Results/Comments      Hep B sAg     Hep C AB      HIV     Syphilis     GC/CT     Carrier Screening     Semen Analysis      Sperm Freeze     Date Done Miscellaneous Results/Comments   N/A BMI Checklist  BMI > 40 or < 18    N/A >= 45 Checklist     **Does not need to be completed prior to placing on IVF calendar**    MD Completion:  PAT needed: No  Ectopic Risk: No  Medically Complex: No    Will check E2, P4, LH to determine when to start OCP for lead in to IVF stim   Reviewed and approved by TOM GAMA on 3/27/24 at 3:32 PM.

## 2024-03-14 ENCOUNTER — TELEPHONE (OUTPATIENT)
Dept: ORTHOPEDIC SURGERY | Facility: HOSPITAL | Age: 29
End: 2024-03-14
Payer: COMMERCIAL

## 2024-03-14 NOTE — TELEPHONE ENCOUNTER
Left a voicemail on patients phone letting her know that her appointment has been canceled and that she would need to see a hand specialist.

## 2024-03-21 ENCOUNTER — APPOINTMENT (OUTPATIENT)
Dept: ORTHOPEDIC SURGERY | Facility: CLINIC | Age: 29
End: 2024-03-21
Payer: COMMERCIAL

## 2024-03-27 DIAGNOSIS — N91.5 OLIGOMENORRHEA, UNSPECIFIED TYPE: Primary | ICD-10-CM

## 2024-03-27 DIAGNOSIS — Z31.41 FERTILITY TESTING: ICD-10-CM

## 2024-03-27 DIAGNOSIS — N97.9 FEMALE INFERTILITY: ICD-10-CM

## 2024-04-03 ENCOUNTER — LAB (OUTPATIENT)
Dept: LAB | Facility: LAB | Age: 29
End: 2024-04-03
Payer: COMMERCIAL

## 2024-04-03 DIAGNOSIS — Z31.41 FERTILITY TESTING: ICD-10-CM

## 2024-04-03 DIAGNOSIS — N91.5 OLIGOMENORRHEA, UNSPECIFIED TYPE: ICD-10-CM

## 2024-04-03 DIAGNOSIS — N97.9 FEMALE INFERTILITY: ICD-10-CM

## 2024-04-03 DIAGNOSIS — R79.89 ELEVATED TESTOSTERONE LEVEL IN FEMALE: ICD-10-CM

## 2024-04-03 LAB
ESTRADIOL SERPL-MCNC: 192 PG/ML
LH SERPL-ACNC: 2.8 IU/L
PROGEST SERPL-MCNC: <0.3 NG/ML

## 2024-04-03 PROCEDURE — 82670 ASSAY OF TOTAL ESTRADIOL: CPT

## 2024-04-03 PROCEDURE — 84144 ASSAY OF PROGESTERONE: CPT

## 2024-04-03 PROCEDURE — 36415 COLL VENOUS BLD VENIPUNCTURE: CPT

## 2024-04-03 PROCEDURE — 83002 ASSAY OF GONADOTROPIN (LH): CPT

## 2024-04-03 PROCEDURE — 84402 ASSAY OF FREE TESTOSTERONE: CPT

## 2024-04-04 DIAGNOSIS — N97.9 FEMALE INFERTILITY: Primary | ICD-10-CM

## 2024-04-04 NOTE — PROGRESS NOTES
Patient got lab work done last night to assess cycle, planning oocyte preservation and has an Mirena IUD, is not sure where she is at in her cycle to start OCPs.  Patient cannot do retrieval the week of 4/15.  Will call with results and plan.    Component      Latest Ref Rng 4/3/2024   Estradiol      pg/mL 192    Progesterone      ng/mL <0.3    LH      IU/L 2.8        04/04/24 at 9:01 AM - Ashley Oneal RN    Monitoring patient: patient has Mirena & does not have a menstrual cycle. She is trying to do an OCP start for egg preservation cycle: Labs yesterday:  E2-192, LH- 2.8, P4- <0.3. Patient to repeat E2, LH, & P4 in 1 week. Plan to be communicated by RN. Plan agreed upon by Dr. Zelaya. Zahra Smith, MARIA DE JESUS 04/04/24 12:51 PM

## 2024-04-07 LAB
TESTOSTERONE FREE (CHAN): 2.4 PG/ML (ref 0.1–6.4)
TESTOSTERONE,TOTAL,LC-MS/MS: 44 NG/DL (ref 2–45)

## 2024-04-10 ENCOUNTER — LAB (OUTPATIENT)
Dept: LAB | Facility: LAB | Age: 29
End: 2024-04-10
Payer: COMMERCIAL

## 2024-04-10 ENCOUNTER — OFFICE VISIT (OUTPATIENT)
Dept: ORTHOPEDIC SURGERY | Facility: CLINIC | Age: 29
End: 2024-04-10
Payer: COMMERCIAL

## 2024-04-10 ENCOUNTER — APPOINTMENT (OUTPATIENT)
Dept: ENDOCRINOLOGY | Facility: CLINIC | Age: 29
End: 2024-04-10
Payer: COMMERCIAL

## 2024-04-10 DIAGNOSIS — M65.4 DE QUERVAIN'S TENOSYNOVITIS: Primary | ICD-10-CM

## 2024-04-10 DIAGNOSIS — N97.9 FEMALE INFERTILITY: ICD-10-CM

## 2024-04-10 PROCEDURE — 84144 ASSAY OF PROGESTERONE: CPT

## 2024-04-10 PROCEDURE — 1036F TOBACCO NON-USER: CPT | Performed by: ORTHOPAEDIC SURGERY

## 2024-04-10 PROCEDURE — 3008F BODY MASS INDEX DOCD: CPT | Performed by: ORTHOPAEDIC SURGERY

## 2024-04-10 PROCEDURE — 82670 ASSAY OF TOTAL ESTRADIOL: CPT

## 2024-04-10 PROCEDURE — 99213 OFFICE O/P EST LOW 20 MIN: CPT | Performed by: ORTHOPAEDIC SURGERY

## 2024-04-10 PROCEDURE — 36415 COLL VENOUS BLD VENIPUNCTURE: CPT

## 2024-04-10 PROCEDURE — 83002 ASSAY OF GONADOTROPIN (LH): CPT

## 2024-04-10 PROCEDURE — 99203 OFFICE O/P NEW LOW 30 MIN: CPT | Performed by: ORTHOPAEDIC SURGERY

## 2024-04-10 RX ORDER — METHYLPREDNISOLONE 4 MG/1
TABLET ORAL
Qty: 21 TABLET | Refills: 0 | Status: SHIPPED | OUTPATIENT
Start: 2024-04-10 | End: 2024-05-24 | Stop reason: ALTCHOICE

## 2024-04-10 NOTE — LETTER
April 11, 2024     Elsa Lou, APRN-CNP  [address]  [address]  [facility] [address]    Patient: Lo Daniels   YOB: 1995   Date of Visit: 4/10/2024       Dear Dr. Elsa Lou, APRN-CNP:    Thank you for referring Lo Daniels to me for evaluation. Below are my notes for this consultation.  If you have questions, please do not hesitate to call me. I look forward to following your patient along with you.       Sincerely,     Agustin Cain MD      CC: No Recipients  ______________________________________________________________________________________    CHIEF COMPLAINT         Bilateral de Quervain's    ASSESSMENT + PLAN    Bilateral de Quervain's tenosynovitis, worse on the right    The nature of de Quervain's tendinitis was reviewed, along with the natural history.  I reviewed the options for management, including observation, nonsteroidals, splinting, oral steroids, cortisone injection, or surgical release, along with the likely success rates of each.     The patient wanted to try a course of splinting and a Medrol Dosepak.  I reviewed the risks of oral steroids, including GI upset, adrenal suppression, and bone thinning, though these risks are minimal for a short course like a Dosepak.  The transient elevation of blood glucose measurement was also reviewed.      A prescription for Medrol Dosepak was electronically transmitted to their pharmacy of choice.    Patient will followup in 4 weeks, if still symptomatic, to discuss cortisone injection or surgical decompression.  She should continue with splinting, and I did review a thumb spica splint as a better option than a simple wrist splint.  This should be worn whenever she is out of bed and not bathing.        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  This began about 6 months ago.  There is no single specific  recalled trauma around time of onset.  Pain is minimal and aching at baseline but becomes quite sharp and focal with pinch or grasp.  There is no popping or clicking.  No numbness or tingling.  It does not wake her from sleep but is bothersome first thing in the morning.  It tends to be worse with days of heavier hand use.  No new pets or young children around.    She is not diabetic or hypothyroid.  She does not smoke.      REVIEW OF SYSTEMS       A 30-item multi-system Review Of Systems was obtained on today's intake form.  This was reviewed with the patient and is correct.  The pertinent positives and negatives are listed above.  The form has been scanned separately into the medical record.      PHYSICAL EXAM    Constitutional:    Appears stated age. Well-developed and well-nourished female in no acute distress.  Psychiatric:         Pleasant normal mood and affect. Behavior is appropriate for the situation.   Head:                   Normocephalic and atraumatic.  Eyes:                    Pupils are equal and round.  Cardiovascular:  2+ radial and ulnar pulses. Fingers well-perfused.  Respiratory:        Effort normal. No respiratory distress. Speaking in complete sentences.  Neurologic:       Alert and oriented to person, place, and time.  Skin:                Skin is intact, warm and dry.  Hematologic / Lymphatic:    No lymphedema or lymphangitis.    Extremities / Musculoskeletal:                      Skin of both hands and wrists is intact with no erythema, ecchymosis, or diffuse swelling.  Normal skin drag and coloration.  Full composite finger flexion extension with full intrinsic plus minus posture.  Good thumb opposition to station 9 bilaterally.  Positive Finkelstein but much less bothersome wrist flexion-thumb extension test.  Pure resisted thumb extension at the proximal phalanx does reproduce discomfort.  No CMC or triggering signs.  Symmetric wrist and forearm motion.  Negative Guido.  Negative  midcarpal shift.  Sensation intact to light touch in all distributions.  Capillary refill less than 2 seconds.      IMAGING / LABS / EMGs           None pertinent      Past Medical History:   Diagnosis Date   • Viral wart, unspecified 03/31/2022    Wart on thumb       Medication Documentation Review Audit       Reviewed by Agustin Cain MD (Physician) on 04/11/24 at 1508      Medication Order Taking? Sig Documenting Provider Last Dose Status   buPROPion XL (Wellbutrin XL) 300 mg 24 hr tablet [id number]  TAKE 1 TABLET BY MOUTH EVERY DAY MARK Brannon  Active   docusate sodium (Colace) 100 mg capsule [id number] No  Historical Provider, MD Taking Active   FLUoxetine (PROzac) 20 mg capsule [id number]  TAKE 3 CAPSULES BY MOUTH EVERY DAY MARK Brannon  Active   levonorgestrel (Mirena) 20 mcg/24 hours (8 yrs) 52 mg IUD [id number] No Mirena (52 MG) IUD   Refills: 0       Active Historical Provider, MD Taking Active   methylPREDNISolone (Medrol Dospak) 4 mg tablets [id number]  Use as directed by package instructions Agustin Cain MD  Active   multivitamin tablet [id number]  Take 1 tablet by mouth once daily. Historical Provider, MD  Active                    Allergies   Allergen Reactions   • Amoxicillin Hives       Social History     Socioeconomic History   • Marital status:      Spouse name: Not on file   • Number of children: Not on file   • Years of education: Not on file   • Highest education level: Not on file   Occupational History   • Not on file   Tobacco Use   • Smoking status: Never   • Smokeless tobacco: Never   Substance and Sexual Activity   • Alcohol use: Not Currently   • Drug use: Never   • Sexual activity: Not on file   Other Topics Concern   • Not on file   Social History Narrative   • Not on file     Social Determinants of Health     Financial Resource Strain: Not on file   Food Insecurity: Not on file   Transportation Needs: Not on file   Physical Activity: Not on file    Stress: Not on file   Social Connections: Not on file   Intimate Partner Violence: Not on file   Housing Stability: Not on file       Past Surgical History:   Procedure Laterality Date   • OTHER SURGICAL HISTORY  07/08/2021    [place] tooth extraction         Electronically Signed      ANGEL Cain MD      Orthopaedic Hand Surgery      [phone]

## 2024-04-10 NOTE — PROGRESS NOTES
3/28/19; 10:30 -  
CM participated in IDRs on patient. Patient had a gastric bypass in 10/2018. 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 Oncology consult pending. Patient will have a thoracic biopsy tomorrow, 3/29. CRM: Delmi Rouse, MPH, [address] [facility]; Z: [phone] Patient presents to  lab today to assess cycle for OCP lead in to Antagonist protocol for IVF for egg preservation. Patient has IUD in place and does not have menstrual cycle.  Will call with results and plan.  Eun Benites 04/10/24 10:19 AM    Labs not drawn by end of day. Patient added to noon huddle for 4/11/24.   Eun Benites 04/10/24 3:22 PM.

## 2024-04-11 DIAGNOSIS — N97.9 FEMALE INFERTILITY: ICD-10-CM

## 2024-04-11 PROBLEM — M65.4 DE QUERVAIN'S TENOSYNOVITIS: Status: ACTIVE | Noted: 2024-04-11

## 2024-04-11 LAB
ESTRADIOL SERPL-MCNC: 133 PG/ML
LH SERPL-ACNC: 3.5 IU/L
PROGEST SERPL-MCNC: 0.4 NG/ML

## 2024-04-11 RX ORDER — NORGESTIMATE AND ETHINYL ESTRADIOL 0.25-0.035
1 KIT ORAL DAILY
Qty: 28 TABLET | Refills: 0 | Status: SHIPPED | OUTPATIENT
Start: 2024-04-11 | End: 2024-04-29 | Stop reason: SDUPTHER

## 2024-04-11 NOTE — PROGRESS NOTES
Patient presents to  lab today to assess cycle for OCP lead in to Antagonist protocol for IVF for egg preservation. Patient has IUD in place and does not have menstrual cycle. Will call with results and plan.  Mabel Rhodes RN 04/11/24 10:37 AM     RN reviewed patient's labs with provider team. Per Dr Zelaya, ok for patient to start her ocp lead in for IVF. RN will communicate patient's plan through my chart message.   Mabel Rhodes RN 04/11/24 4:19 PM

## 2024-04-11 NOTE — PROGRESS NOTES
CHIEF COMPLAINT         Bilateral de Quervain's    ASSESSMENT + PLAN    Bilateral de Quervain's tenosynovitis, worse on the right    The nature of de Quervain's tendinitis was reviewed, along with the natural history.  I reviewed the options for management, including observation, nonsteroidals, splinting, oral steroids, cortisone injection, or surgical release, along with the likely success rates of each.     The patient wanted to try a course of splinting and a Medrol Dosepak.  I reviewed the risks of oral steroids, including GI upset, adrenal suppression, and bone thinning, though these risks are minimal for a short course like a Dosepak.  The transient elevation of blood glucose measurement was also reviewed.      A prescription for Medrol Dosepak was electronically transmitted to their pharmacy of choice.    Patient will followup in 4 weeks, if still symptomatic, to discuss cortisone injection or surgical decompression.  She should continue with splinting, and I did review a thumb spica splint as a better option than a simple wrist splint.  This should be worn whenever she is out of bed and not bathing.        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  This began about 6 months ago.  There is no single specific recalled trauma around time of onset.  Pain is minimal and aching at baseline but becomes quite sharp and focal with pinch or grasp.  There is no popping or clicking.  No numbness or tingling.  It does not wake her from sleep but is bothersome first thing in the morning.  It tends to be worse with days of heavier hand use.  No new pets or young children around.    She is not diabetic or hypothyroid.  She does not smoke.      REVIEW OF SYSTEMS       A 30-item multi-system Review Of Systems was obtained on today's intake form.  This was reviewed with the patient and is correct.  The  pertinent positives and negatives are listed above.  The form has been scanned separately into the medical record.      PHYSICAL EXAM    Constitutional:    Appears stated age. Well-developed and well-nourished female in no acute distress.  Psychiatric:         Pleasant normal mood and affect. Behavior is appropriate for the situation.   Head:                   Normocephalic and atraumatic.  Eyes:                    Pupils are equal and round.  Cardiovascular:  2+ radial and ulnar pulses. Fingers well-perfused.  Respiratory:        Effort normal. No respiratory distress. Speaking in complete sentences.  Neurologic:       Alert and oriented to person, place, and time.  Skin:                Skin is intact, warm and dry.  Hematologic / Lymphatic:    No lymphedema or lymphangitis.    Extremities / Musculoskeletal:                      Skin of both hands and wrists is intact with no erythema, ecchymosis, or diffuse swelling.  Normal skin drag and coloration.  Full composite finger flexion extension with full intrinsic plus minus posture.  Good thumb opposition to station 9 bilaterally.  Positive Finkelstein but much less bothersome wrist flexion-thumb extension test.  Pure resisted thumb extension at the proximal phalanx does reproduce discomfort.  No CMC or triggering signs.  Symmetric wrist and forearm motion.  Negative Guido.  Negative midcarpal shift.  Sensation intact to light touch in all distributions.  Capillary refill less than 2 seconds.      IMAGING / LABS / EMGs           None pertinent      Past Medical History:   Diagnosis Date    Viral wart, unspecified 03/31/2022    Wart on thumb       Medication Documentation Review Audit       Reviewed by Agustin Cain MD (Physician) on 04/11/24 at 1508      Medication Order Taking? Sig Documenting Provider Last Dose Status   buPROPion XL (Wellbutrin XL) 300 mg 24 hr tablet [id number]  TAKE 1 TABLET BY MOUTH EVERY DAY BETTINA Brannon-CNP  Active   docusate sodium  (Colace) 100 mg capsule [id number] No  Historical Provider, MD Taking Active   FLUoxetine (PROzac) 20 mg capsule [id number]  TAKE 3 CAPSULES BY MOUTH EVERY DAY Elsa Lou APRN-CNP  Active   levonorgestrel (Mirena) 20 mcg/24 hours (8 yrs) 52 mg IUD [id number] No Mirena (52 MG) IUD   Refills: 0       Active Historical Provider, MD Taking Active   methylPREDNISolone (Medrol Dospak) 4 mg tablets [id number]  Use as directed by package instructions Agustin Cain MD  Active   multivitamin tablet [id number]  Take 1 tablet by mouth once daily. Historical Provider, MD  Active                    Allergies   Allergen Reactions    Amoxicillin Hives       Social History     Socioeconomic History    Marital status:      Spouse name: Not on file    Number of children: Not on file    Years of education: Not on file    Highest education level: Not on file   Occupational History    Not on file   Tobacco Use    Smoking status: Never    Smokeless tobacco: Never   Substance and Sexual Activity    Alcohol use: Not Currently    Drug use: Never    Sexual activity: Not on file   Other Topics Concern    Not on file   Social History Narrative    Not on file     Social Determinants of Health     Financial Resource Strain: Not on file   Food Insecurity: Not on file   Transportation Needs: Not on file   Physical Activity: Not on file   Stress: Not on file   Social Connections: Not on file   Intimate Partner Violence: Not on file   Housing Stability: Not on file       Past Surgical History:   Procedure Laterality Date    OTHER SURGICAL HISTORY  07/08/2021    Amo tooth extraction         Electronically Signed      ANGEL Cain MD      Orthopaedic Hand Surgery      [phone]   Male

## 2024-04-22 ENCOUNTER — TELEPHONE (OUTPATIENT)
Dept: ENDOCRINOLOGY | Facility: CLINIC | Age: 29
End: 2024-04-22

## 2024-04-23 ENCOUNTER — ANCILLARY PROCEDURE (OUTPATIENT)
Dept: ENDOCRINOLOGY | Facility: CLINIC | Age: 29
End: 2024-04-23
Payer: COMMERCIAL

## 2024-04-23 DIAGNOSIS — Z01.812 PRE-PROCEDURE LAB EXAM: ICD-10-CM

## 2024-04-23 DIAGNOSIS — N97.9 FEMALE INFERTILITY: ICD-10-CM

## 2024-04-23 LAB
ESTRADIOL SERPL-MCNC: 20 PG/ML
HCT VFR BLD AUTO: 37.2 % (ref 36–46)

## 2024-04-23 PROCEDURE — 85014 HEMATOCRIT: CPT

## 2024-04-23 PROCEDURE — 82670 ASSAY OF TOTAL ESTRADIOL: CPT

## 2024-04-23 PROCEDURE — 36415 COLL VENOUS BLD VENIPUNCTURE: CPT

## 2024-04-23 PROCEDURE — 76857 US EXAM PELVIC LIMITED: CPT

## 2024-04-23 RX ORDER — GANIRELIX ACETATE 250 UG/.5ML
250 INJECTION, SOLUTION SUBCUTANEOUS EVERY MORNING
Qty: 5 EACH | Refills: 2 | Status: SHIPPED | OUTPATIENT
Start: 2024-04-23 | End: 2024-04-26 | Stop reason: SDUPTHER

## 2024-04-23 RX ORDER — LEUPROLIDE ACETATE 1 MG/0.2ML
4 KIT SUBCUTANEOUS AS NEEDED
Qty: 1 KIT | Refills: 0 | Status: SHIPPED | OUTPATIENT
Start: 2024-04-23 | End: 2024-04-26 | Stop reason: SDUPTHER

## 2024-04-23 RX ORDER — CHORIONIC GONADOTROPIN 10000 UNIT
10000 KIT INTRAMUSCULAR ONCE AS NEEDED
Qty: 1 EACH | Refills: 0 | Status: SHIPPED | OUTPATIENT
Start: 2024-04-23 | End: 2024-04-26 | Stop reason: SDUPTHER

## 2024-04-23 NOTE — PROGRESS NOTES
CLAIRE NOTE - IVF STIM BASELINE  Patient presents for baseline ultrasound and/or labs.    Treatment protocol: Antagonist, ,   Lead in: OCP  Start date for lead in: 4/11/24  Last estrace/OCP date: 4/23/24  PGT-A/M? No  Plan to freeze: mature oocytes    Boarding pass signed off:  Yes    Medically complex on boarding pass:   no    If indicated, is PAT consult complete?  N/A    SPERM:  na    Additional details:   Eun Benites  04/23/2024  7:11 AM    Patient presented today for baseline, however does not have medications ordered/arrived. Plan for patient to continue on OCPs and return in 1 week to re-baseline.  D/w Dr. Garcia Gusman 04/23/24 1:39 PM     Phone call to patient, no answer, detailed VM left that reviewed plan as noted above. Patient instructed to call the office tomorrow to schedule another baseline for 4/30/24. Stimulation meds ordered to [facility] (patient has [facility] benefits).   Eun Benites 04/23/24 1:49 PM

## 2024-04-25 ENCOUNTER — OFFICE VISIT (OUTPATIENT)
Dept: ORTHOPEDIC SURGERY | Facility: CLINIC | Age: 29
End: 2024-04-25
Payer: COMMERCIAL

## 2024-04-25 VITALS — HEIGHT: 68 IN | WEIGHT: 217 LBS | BODY MASS INDEX: 32.89 KG/M2

## 2024-04-25 DIAGNOSIS — M65.4 DE QUERVAIN'S TENOSYNOVITIS: Primary | ICD-10-CM

## 2024-04-25 PROCEDURE — 2500000004 HC RX 250 GENERAL PHARMACY W/ HCPCS (ALT 636 FOR OP/ED): Performed by: ORTHOPAEDIC SURGERY

## 2024-04-25 PROCEDURE — 1036F TOBACCO NON-USER: CPT | Performed by: ORTHOPAEDIC SURGERY

## 2024-04-25 PROCEDURE — 3008F BODY MASS INDEX DOCD: CPT | Performed by: ORTHOPAEDIC SURGERY

## 2024-04-25 PROCEDURE — 20550 NJX 1 TENDON SHEATH/LIGAMENT: CPT | Performed by: ORTHOPAEDIC SURGERY

## 2024-04-25 PROCEDURE — 99214 OFFICE O/P EST MOD 30 MIN: CPT | Performed by: ORTHOPAEDIC SURGERY

## 2024-04-25 PROCEDURE — 2500000005 HC RX 250 GENERAL PHARMACY W/O HCPCS: Performed by: ORTHOPAEDIC SURGERY

## 2024-04-25 RX ORDER — LIDOCAINE HYDROCHLORIDE 10 MG/ML
0.5 INJECTION INFILTRATION; PERINEURAL
Status: COMPLETED | OUTPATIENT
Start: 2024-04-25 | End: 2024-04-25

## 2024-04-25 RX ORDER — TRIAMCINOLONE ACETONIDE 40 MG/ML
20 INJECTION, SUSPENSION INTRA-ARTICULAR; INTRAMUSCULAR
Status: COMPLETED | OUTPATIENT
Start: 2024-04-25 | End: 2024-04-25

## 2024-04-25 RX ADMIN — TRIAMCINOLONE ACETONIDE 20 MG: 40 INJECTION, SUSPENSION INTRA-ARTICULAR; INTRAMUSCULAR at 11:48

## 2024-04-25 RX ADMIN — LIDOCAINE HYDROCHLORIDE 0.5 ML: 10 INJECTION, SOLUTION INFILTRATION; PERINEURAL at 11:48

## 2024-04-25 RX ADMIN — TRIAMCINOLONE ACETONIDE 20 MG: 40 INJECTION, SUSPENSION INTRA-ARTICULAR; INTRAMUSCULAR at 11:49

## 2024-04-25 RX ADMIN — LIDOCAINE HYDROCHLORIDE 0.5 ML: 10 INJECTION, SOLUTION INFILTRATION; PERINEURAL at 11:49

## 2024-04-25 ASSESSMENT — PAIN DESCRIPTION - DESCRIPTORS: DESCRIPTORS: ACHING

## 2024-04-25 ASSESSMENT — PAIN SCALES - GENERAL: PAINLEVEL_OUTOF10: 5 - MODERATE PAIN

## 2024-04-25 ASSESSMENT — PAIN - FUNCTIONAL ASSESSMENT: PAIN_FUNCTIONAL_ASSESSMENT: 0-10

## 2024-04-25 NOTE — PROGRESS NOTES
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 She has a several month history of bilateral wrist radio-styloid tenosynovitis. She was diagnosed by Dr. Cain earlier this month. Treatment at that time was Dosepak and bilateral thumb spica splints. She notes symptoms improved on Dosepak, when it finished symptoms returned. Patient is getting ready to start packing, she is relocating to [place], [place] this summer.    Please refer to New Patient Intake Form scanned into patient's electronic record for self-reported past medical history, past surgical history, medications, allergies, family history, social history and 10 point review of systems.        Examination:   Constitutional: Oriented to person, place, and time.   Appears well-developed and well-nourished.   Head: Normocephalic and atraumatic.   Eyes: Pupils are equal, round, and reactive to light.   Cardiovascular: Intact distal pulses.   Pulmonary/Chest/Breast: Effort normal. No respiratory distress.   Neurological: Alert and oriented to person, place, and time.   Skin: Skin is warm and dry.   Psychiatric: normal mood and affect. Behavior is normal.   Musculoskeletal: Examination today reveals normal appearance of bilateral wrists. Tenderness to palpation bilateral first compartments, worse on the left. Normal sensation to radial sensory nerve distribution. Positive Finkelstein maneuver.      No new images were obtained.       Impression: bilateral wrist dequervain tenosynovitis       Plan: We have discussed treatment options of injections and surgery. Given the fact that she has already attempted splinting and medrol dose pack, as well as her time constraints with training, completion and relocation, she has elected to proceed with injections today. She was given bilateral wrist first dorsal compartment injections today. She can continue to wear the splints at night time and watch for signs of  improvement. I've given her information on how to find a hand surgeon in [place], [place] should her symptoms return after she has relocated there.    Hand / UE Inj/Asp: R extensor compartment 1 for de Quervain's tenosynovitis on 4/25/2024 11:48 AM  Indications: tendon swelling and pain  Details: 25 G needle, radial approach  Medications: 20 mg triamcinolone acetonide 40 mg/mL; 0.5 mL lidocaine 10 mg/mL (1 %)  Outcome: tolerated well, no immediate complications  Procedure, treatment alternatives, risks and benefits explained, specific risks discussed. Immediately prior to procedure a time out was called to verify the correct patient, procedure, equipment, support staff and site/side marked as required. Patient was prepped and draped in the usual sterile fashion.       Hand / UE Inj/Asp: L extensor compartment 1 for de Quervain's tenosynovitis on 4/25/2024 11:49 AM  Indications: tendon swelling and pain  Details: 25 G needle, radial approach  Medications: 20 mg triamcinolone acetonide 40 mg/mL; 0.5 mL lidocaine 10 mg/mL (1 %)  Outcome: tolerated well, no immediate complications  Procedure, treatment alternatives, risks and benefits explained, specific risks discussed. Immediately prior to procedure a time out was called to verify the correct patient, procedure, equipment, support staff and site/side marked as required. Patient was prepped and draped in the usual sterile fashion.           Thanh Feliciano MD      [facility] [employer]   Department of Orthopaedic Surgery   Chief of Hand and Upper Extremity Surgery   [facility]       Scribe Attestation  By signing my name below, I, Elizabeth Mendieta, attest that this documentation has been prepared under the direction and in the presence of Thanh Feliciano MD.

## 2024-04-26 DIAGNOSIS — N97.9 FEMALE INFERTILITY: ICD-10-CM

## 2024-04-26 RX ORDER — LEUPROLIDE ACETATE 1 MG/0.2ML
4 KIT SUBCUTANEOUS AS NEEDED
Qty: 1 KIT | Refills: 0 | Status: SHIPPED
Start: 2024-04-26 | End: 2024-04-29 | Stop reason: SDUPTHER

## 2024-04-26 RX ORDER — GANIRELIX ACETATE 250 UG/.5ML
250 INJECTION, SOLUTION SUBCUTANEOUS EVERY MORNING
Qty: 5 EACH | Refills: 2 | Status: SHIPPED
Start: 2024-04-26 | End: 2024-04-29 | Stop reason: SDUPTHER

## 2024-04-26 RX ORDER — CHORIONIC GONADOTROPIN 10000 UNIT
10000 KIT INTRAMUSCULAR ONCE AS NEEDED
Qty: 1 EACH | Refills: 0 | Status: SHIPPED | OUTPATIENT
Start: 2024-04-26 | End: 2024-04-29 | Stop reason: SDUPTHER

## 2024-04-26 NOTE — TELEPHONE ENCOUNTER
Reason for call:   Notes: Pharmacy called and said all of her meds need to [facility]    [facility]  P: [phone]  F: [phone]

## 2024-04-26 NOTE — TELEPHONE ENCOUNTER
Telephone call returned to patient. Voicemail box received. This RN informed patient via voicemail that I am happy to reorder medications to the correct pharmacy, but that I am in need of clarification. The [facility] phone/fax number provided do not match any pharmacy named [facility]. When this RN typed in [facility] into the pharmacy search, the only option was [facility] in [place] on [address]. This RN informed patient of this information via voicemail. This RN encouraged patient to either call back or MyChart with a response. Office number and hours given to patient via voicemail.    04/26/24 at 2:49 PM - Isha Valdez RN

## 2024-04-29 DIAGNOSIS — N97.9 FEMALE INFERTILITY: ICD-10-CM

## 2024-04-29 RX ORDER — LEUPROLIDE ACETATE 1 MG/0.2ML
4 KIT SUBCUTANEOUS AS NEEDED
Qty: 1 KIT | Refills: 0 | Status: SHIPPED | OUTPATIENT
Start: 2024-04-29 | End: 2024-04-30 | Stop reason: SDUPTHER

## 2024-04-29 RX ORDER — GANIRELIX ACETATE 250 UG/.5ML
250 INJECTION, SOLUTION SUBCUTANEOUS EVERY MORNING
Qty: 5 EACH | Refills: 2 | Status: SHIPPED | OUTPATIENT
Start: 2024-04-29 | End: 2024-04-30 | Stop reason: SDUPTHER

## 2024-04-29 RX ORDER — NORGESTIMATE AND ETHINYL ESTRADIOL 0.25-0.035
1 KIT ORAL DAILY
Qty: 28 TABLET | Refills: 0 | Status: SHIPPED | OUTPATIENT
Start: 2024-04-29 | End: 2024-05-24 | Stop reason: ALTCHOICE

## 2024-04-29 RX ORDER — CHORIONIC GONADOTROPIN 10000 UNIT
10000 KIT INTRAMUSCULAR ONCE AS NEEDED
Qty: 1 EACH | Refills: 0 | Status: SHIPPED | OUTPATIENT
Start: 2024-04-29 | End: 2024-04-30 | Stop reason: SDUPTHER

## 2024-04-29 NOTE — PROGRESS NOTES
Patient sent  message that pharmacy did not have prescriptions.   Patient has [employer] and should not have had meds cancelled and sent to [facility]. Advised patient this RN will re-send to [facility] today. She will contact them tomorrow for delivery.    Baseline rescheduled to 5/3/24. Patient will continue active OCP and anticipate last OCP on 5/3/24 with an injection start of 5/7/24. Patient verbalizes understanding and denies additional questions at this time.  Eun Benites 04/29/24 12:42 PM

## 2024-04-30 ENCOUNTER — APPOINTMENT (OUTPATIENT)
Dept: ENDOCRINOLOGY | Facility: CLINIC | Age: 29
End: 2024-04-30
Payer: COMMERCIAL

## 2024-04-30 RX ORDER — LEUPROLIDE ACETATE 1 MG/0.2ML
4 KIT SUBCUTANEOUS AS NEEDED
Qty: 1 KIT | Refills: 0 | Status: SHIPPED
Start: 2024-04-30 | End: 2024-05-24 | Stop reason: ALTCHOICE

## 2024-04-30 RX ORDER — CHORIONIC GONADOTROPIN 10000 UNIT
10000 KIT INTRAMUSCULAR ONCE AS NEEDED
Qty: 1 EACH | Refills: 0 | Status: SHIPPED | OUTPATIENT
Start: 2024-04-30 | End: 2024-05-03 | Stop reason: SDUPTHER

## 2024-04-30 RX ORDER — GANIRELIX ACETATE 250 UG/.5ML
250 INJECTION, SOLUTION SUBCUTANEOUS EVERY MORNING
Qty: 5 EACH | Refills: 2 | Status: SHIPPED
Start: 2024-04-30 | End: 2024-05-24 | Stop reason: ALTCHOICE

## 2024-05-01 ENCOUNTER — DOCUMENTATION (OUTPATIENT)
Dept: ENDOCRINOLOGY | Facility: CLINIC | Age: 29
End: 2024-05-01
Payer: COMMERCIAL

## 2024-05-01 NOTE — PROGRESS NOTES
TC to [facility], patient requires PA for IVF meds. Rxs printed and faxed to [facility] at [phone].  TC to patient, aware of need for PA. Will call [facility] tomorrow to check on status/approval of meds. Will call office to change baseline from 5/3/24 to next week if unable to have meds delivered by end of week. If meds delivered on Friday, will baseline and stop OCP with med start on 5/67/24. Patient verbalizes understanding and denies additional questions at this time.  Eun Benites 05/01/24 5:02 PM

## 2024-05-02 ENCOUNTER — TELEPHONE (OUTPATIENT)
Dept: ENDOCRINOLOGY | Facility: CLINIC | Age: 29
End: 2024-05-02

## 2024-05-02 NOTE — TELEPHONE ENCOUNTER
"Reason for call: Patient is calling to get her treatment plan to be sent to "[facility] [employer] [facility]".  Notes:    Pt states [facility] called her and said she needs to have us send \"treatment plan\" to them. I called [facility] at [phone] and spoke with Karen. She states [facility] needs the following info:  Approval auth number for procedure (IVF)  Verification Dr. Meredith, our clininc and our lab are in the [facility] network.    Urgent message sent to Renata Stokes and Monique Ames for this info.    Win Gutierrez 05/02/24 2:08 PM    "

## 2024-05-03 ENCOUNTER — APPOINTMENT (OUTPATIENT)
Dept: ENDOCRINOLOGY | Facility: CLINIC | Age: 29
End: 2024-05-03
Payer: COMMERCIAL

## 2024-05-03 ENCOUNTER — TELEPHONE (OUTPATIENT)
Dept: ENDOCRINOLOGY | Facility: CLINIC | Age: 29
End: 2024-05-03
Payer: COMMERCIAL

## 2024-05-03 DIAGNOSIS — N97.9 FEMALE INFERTILITY: ICD-10-CM

## 2024-05-03 RX ORDER — CHORIONIC GONADOTROPIN 10000 UNIT
10000 KIT INTRAMUSCULAR ONCE AS NEEDED
Qty: 1 EACH | Refills: 0 | Status: SHIPPED | OUTPATIENT
Start: 2024-05-03 | End: 2024-05-06 | Stop reason: SDUPTHER

## 2024-05-03 NOTE — TELEPHONE ENCOUNTER
New order pended to provider for Pregnyl to [facility].   MICHAEL SANTAMARIA on 5/3/24 at 2:49 PM.

## 2024-05-03 NOTE — TELEPHONE ENCOUNTER
Caller: [facility]  Reason for call: Medication request  Medication requested: Pregnyl new order request

## 2024-05-06 ENCOUNTER — SPECIALTY PHARMACY (OUTPATIENT)
Dept: PHARMACY | Facility: CLINIC | Age: 29
End: 2024-05-06

## 2024-05-06 ENCOUNTER — ANCILLARY PROCEDURE (OUTPATIENT)
Dept: ENDOCRINOLOGY | Facility: CLINIC | Age: 29
End: 2024-05-06
Payer: COMMERCIAL

## 2024-05-06 ENCOUNTER — TELEPHONE (OUTPATIENT)
Dept: ENDOCRINOLOGY | Facility: CLINIC | Age: 29
End: 2024-05-06

## 2024-05-06 DIAGNOSIS — N97.9 FEMALE INFERTILITY: ICD-10-CM

## 2024-05-06 LAB — ESTRADIOL SERPL-MCNC: <20 PG/ML

## 2024-05-06 PROCEDURE — 36415 COLL VENOUS BLD VENIPUNCTURE: CPT

## 2024-05-06 PROCEDURE — 82670 ASSAY OF TOTAL ESTRADIOL: CPT

## 2024-05-06 PROCEDURE — 76857 US EXAM PELVIC LIMITED: CPT

## 2024-05-06 RX ORDER — CHORIONIC GONADOTROPIN 10000 UNIT
10000 KIT INTRAMUSCULAR ONCE AS NEEDED
Qty: 1 EACH | Refills: 0 | Status: SHIPPED | OUTPATIENT
Start: 2024-05-06 | End: 2024-05-24 | Stop reason: ALTCHOICE

## 2024-05-06 NOTE — PROGRESS NOTES
CLAIRE NOTE - IVF STIM BASELINE  Patient presents for baseline ultrasound and/or labs.    Treatment protocol: Lead in: OCP (currently has IUD in place)  Stimulation protocol: ANTAGONIST /Menopur 150  (Confirmed RW 3/27/24)  Trigger plan: HCG vs Lupron  Pre-retrieval meds: Antibiotics per protocol  Adjuncts:   Notes: Repeat Testosterone prior to IVF: Testosterone=2.4 on 4.3.24    Lead in: OCP  Start date for lead in: 4.11.24 (per labs as pt has IUD in place and does not get periods)  Last estrace/OCP date: will continue OCP for now until she receives IVF meds from pharmacy  PGT-A/M? No  PGT order scanned into Epic, confirmed by: n/a  Plan to freeze: mature oocytes    [employer] forms/out waiver complete:  Yes    Boarding pass signed off:  Yes    Medically complex on boarding pass:   no    If indicated, is PAT consult complete?  N/A    SPERM:  N/A  Additional details: n/a  Win Gutierrez  05/06/2024  1:21 PM    Reviewed and approved by RACHELLE ALLEN on 5/6/24 at 1:33 PM.      Win Gutierrez  05/06/2024  7:54 AM      My Chart message sent to patient:  Hi Lo,  Everything looked good on your baseline US and labs today. At this point we just have to wait until you know when meds are arriving to complete a plan.   Please send us a My Chart message or give us a call when you have more information. Stay on ACTIVE birth control pills until we know when medications are coming. If you need to refill birth control, please do so- we do not want you taking placebo pills.      Thank you-  SAMMY Walden KARAN S on 5/6/24 at 1:32 PM.     Pt does not have an answer of when meds will be delivered yet. She understands to stay on active OCP daily and contact us when she has info about med delivery. Win Gutierrez 05/06/24 3:45 PM

## 2024-05-06 NOTE — TELEPHONE ENCOUNTER
Reason for call: Patient calling to talk to a nurse. The specialty pharmacy told her that they will need Rx for a couple of her meds.   Notes:    Pt states [facility] needed rx. I called them- they had all rx on file and approved, except HCG for trigger, so I verbally called that in.     [employer] message sent to patient.  Win Gutierrez 05/06/24 4:54 PM

## 2024-05-07 ENCOUNTER — TELEPHONE (OUTPATIENT)
Dept: ENDOCRINOLOGY | Facility: CLINIC | Age: 29
End: 2024-05-07
Payer: COMMERCIAL

## 2024-05-07 NOTE — TELEPHONE ENCOUNTER
Called pharmacy and confirmed ok to dispense pregnyl if preferred by insurance. Pharmacy double checked all medications and no other questions at this time.    05/07/24 at 11:55 AM - Ashley Oneal RN

## 2024-05-07 NOTE — TELEPHONE ENCOUNTER
Reason for call: Pharmacy called to change her HCG Rx to Pregnyl for insurance coverage. Please call them at [phone]  Notes:

## 2024-05-10 ENCOUNTER — LAB (OUTPATIENT)
Dept: LAB | Facility: LAB | Age: 29
End: 2024-05-10
Payer: COMMERCIAL

## 2024-05-10 ENCOUNTER — ANCILLARY PROCEDURE (OUTPATIENT)
Dept: ENDOCRINOLOGY | Facility: CLINIC | Age: 29
End: 2024-05-10
Payer: COMMERCIAL

## 2024-05-10 DIAGNOSIS — N97.9 FEMALE INFERTILITY: ICD-10-CM

## 2024-05-10 LAB — ESTRADIOL SERPL-MCNC: <19 PG/ML

## 2024-05-10 PROCEDURE — 76857 US EXAM PELVIC LIMITED: CPT

## 2024-05-10 PROCEDURE — 82670 ASSAY OF TOTAL ESTRADIOL: CPT

## 2024-05-10 PROCEDURE — 36415 COLL VENOUS BLD VENIPUNCTURE: CPT

## 2024-05-10 NOTE — PROGRESS NOTES
CYCLING NOTE    Here for US and/or lab monitoring: IVF baseline repeat due to difficulty obtaining medications. She feels confident she will receive med shipment today.    Team will contact patient later today with results and plan.    Win Gutierrez  05/10/2024  7:16 AM      HUDDLE PROVIDER NOTE  Ultrasound and/or labs reviewed at [facility].   No results found for this or any previous visit (from the past 96 hour(s)).  Mon 5/13 (Day 21)   follitropin beta 900 unit/1.08 mL injection: Inject 200 Units once daily in the evening under the skin   menotropins recon soln injection: Inject 150 Units once daily in the evening under the skin    Tue 5/14 (Day 22)   follitropin beta 900 unit/1.08 mL injection: Inject 200 Units once daily in the evening under the skin   menotropins recon soln injection: Inject 150 Units once daily in the evening under the skin    Wed 5/15 (Day 23)   follitropin beta 900 unit/1.08 mL injection: Inject 200 Units once daily in the evening under the skin   menotropins recon soln injection: Inject 150 Units once daily in the evening under the skin    RTC   5/16/24  for Follicle scan and Estradiol.   Jignesh Zelaya  05/10/2024  1:28 PM    [employer] message sent to patient.  Win Gutierrez 05/10/24 1:56 PM

## 2024-05-16 ENCOUNTER — ANCILLARY PROCEDURE (OUTPATIENT)
Dept: ENDOCRINOLOGY | Facility: CLINIC | Age: 29
End: 2024-05-16
Payer: COMMERCIAL

## 2024-05-16 ENCOUNTER — TELEPHONE (OUTPATIENT)
Dept: ENDOCRINOLOGY | Facility: CLINIC | Age: 29
End: 2024-05-16

## 2024-05-16 ENCOUNTER — APPOINTMENT (OUTPATIENT)
Dept: ORTHOPEDIC SURGERY | Facility: CLINIC | Age: 29
End: 2024-05-16
Payer: COMMERCIAL

## 2024-05-16 DIAGNOSIS — N97.9 FEMALE INFERTILITY: ICD-10-CM

## 2024-05-16 LAB — ESTRADIOL SERPL-MCNC: 203 PG/ML

## 2024-05-16 PROCEDURE — 36415 COLL VENOUS BLD VENIPUNCTURE: CPT

## 2024-05-16 PROCEDURE — 82670 ASSAY OF TOTAL ESTRADIOL: CPT

## 2024-05-16 PROCEDURE — 76857 US EXAM PELVIC LIMITED: CPT

## 2024-05-16 NOTE — PROGRESS NOTES
CYCLING NOTE    Here for US and/or lab monitoring; relevant findings reviewed.    Patient did not stay for discussion after monitoring,  Team will contact patient later today with results and plan.    Win Gutierrez  05/16/2024  7:30 AM    HUDDLE PROVIDER NOTE  [facility]   Results for orders placed or performed in visit on 05/16/24 (from the past 96 hour(s))   Estradiol   Result Value Ref Range    Estradiol 203 pg/mL     Thu 5/16 (Day 4)   follitropin beta 900 unit/1.08 mL injection: Inject 200 Units once daily in the evening under the skin   menotropins recon soln injection: Inject 150 Units once daily in the evening under the skin    Fri 5/17 (Day 5)   follitropin beta 900 unit/1.08 mL injection: Inject 200 Units once daily in the evening under the skin   menotropins recon soln injection: Inject 150 Units once daily in the evening under the skin   ganirelix syringe injection: Inject 250 mcg once daily in the morning under the skin    Sat 5/18 (Day 6)   ganirelix syringe injection: Inject 250 mcg once daily in the morning under the skin    RTC in two days for Follicle scan and Estradiol.   D/w Dr. Masoud Gusman  05/16/2024  1:22 PM    [employer] message sent to patient.  Win Gutierrez 05/16/24 1:50 PM

## 2024-05-16 NOTE — TELEPHONE ENCOUNTER
Reason for call: Patient is calling to talk to a nurse has questions about her medications and Retrieval  Notes:

## 2024-05-16 NOTE — TELEPHONE ENCOUNTER
Called pt and left vm to call office back- phones on 8am-4pm.  Win Gutierrez 05/16/24 2:51 PM    Pt returned my call. We discussed possible timeline for trigger, though she understands it is very early in the cycle to predict that. She will send me a MyChart with her med supply to assure she has enough for weekend. She understands ganirelix starts tomorrow 6a-9a and continues every morning.  Win Gutierrez 05/16/24 3:11 PM

## 2024-05-18 ENCOUNTER — ANCILLARY PROCEDURE (OUTPATIENT)
Dept: ENDOCRINOLOGY | Facility: CLINIC | Age: 29
End: 2024-05-18
Payer: COMMERCIAL

## 2024-05-18 ENCOUNTER — APPOINTMENT (OUTPATIENT)
Dept: LAB | Facility: LAB | Age: 29
End: 2024-05-18
Payer: COMMERCIAL

## 2024-05-18 DIAGNOSIS — N97.9 FEMALE INFERTILITY: ICD-10-CM

## 2024-05-18 LAB
ESTRADIOL SERPL-MCNC: 702 PG/ML
PROGEST SERPL-MCNC: 0.3 NG/ML

## 2024-05-18 PROCEDURE — 76857 US EXAM PELVIC LIMITED: CPT

## 2024-05-18 PROCEDURE — 36415 COLL VENOUS BLD VENIPUNCTURE: CPT

## 2024-05-18 PROCEDURE — 84144 ASSAY OF PROGESTERONE: CPT

## 2024-05-18 PROCEDURE — 82670 ASSAY OF TOTAL ESTRADIOL: CPT

## 2024-05-18 NOTE — PROGRESS NOTES
CYCLING NOTE- IVF egg freezing #1, day 6 of meds, antagonist cycle, started ganirelix yesterday morning.    Here for US and/or lab monitoring; relevant findings reviewed.    Patient stayed for nurse visit. Pain is 0/10. Patient states 19mm cyst has been there on the left in the past.  Team will contact patient later today with results and plan.    Ashley Jm  05/18/2024  10:06 AM    HUDDLE PROVIDER NOTE  Ultrasound and/or labs reviewed at [facility].   Results for orders placed or performed in visit on 05/18/24 (from the past 96 hour(s))   Estradiol   Result Value Ref Range    Estradiol 702 pg/mL     Sat 5/18 (Day 6)   follitropin beta 900 unit/1.08 mL injection: Inject 300 Units once daily in the evening under the skin   menotropins recon soln injection: Inject 150 Units once daily in the evening under the skin   ganirelix syringe injection: Inject 250 mcg once daily in the morning under the skin    Sun 5/19 (Day 7)   follitropin beta 900 unit/1.08 mL injection: Inject 300 Units once daily in the evening under the skin   menotropins recon soln injection: Inject 150 Units once daily in the evening under the skin   ganirelix syringe injection: Inject 250 mcg once daily in the morning under the skin    Mon 5/20 (Day 8)   ganirelix syringe injection: Inject 250 mcg once daily in the morning under the skin    Added progesterone level to rule out premature ovulation. If negative:  RTC in two days for Follicle scan and Estradiol and Progesterone.   Ashley LOPEZ Sharath  05/18/2024  12:20 PM    Progesterone negative. Plan as above.    Chhaya Gregg MD PGY 5  Reproductive Endocrinology and Infertility Fellow

## 2024-05-19 DIAGNOSIS — G47.30 MILD SLEEP APNEA: Primary | ICD-10-CM

## 2024-05-20 ENCOUNTER — ANCILLARY PROCEDURE (OUTPATIENT)
Dept: ENDOCRINOLOGY | Facility: CLINIC | Age: 29
End: 2024-05-20
Payer: COMMERCIAL

## 2024-05-20 ENCOUNTER — LAB (OUTPATIENT)
Dept: LAB | Facility: LAB | Age: 29
End: 2024-05-20
Payer: COMMERCIAL

## 2024-05-20 DIAGNOSIS — N97.9 FEMALE INFERTILITY: ICD-10-CM

## 2024-05-20 LAB
ESTRADIOL SERPL-MCNC: 2006 PG/ML
PROGEST SERPL-MCNC: 0.5 NG/ML

## 2024-05-20 PROCEDURE — 36415 COLL VENOUS BLD VENIPUNCTURE: CPT

## 2024-05-20 PROCEDURE — 84144 ASSAY OF PROGESTERONE: CPT

## 2024-05-20 PROCEDURE — 82670 ASSAY OF TOTAL ESTRADIOL: CPT

## 2024-05-20 PROCEDURE — 76857 US EXAM PELVIC LIMITED: CPT

## 2024-05-20 NOTE — PROGRESS NOTES
CYCLING NOTE    Here for US and/or lab monitoring; relevant findings reviewed    Patient did not stay for discussion after monitoring,  Team will contact patient later today with results and plan.    Win Gutierrez  05/20/2024  12:04 PM    HUDDLE PROVIDER NOTE  [facility]   Results for orders placed or performed in visit on 05/18/24 (from the past 96 hour(s))   Estradiol   Result Value Ref Range    Estradiol 702 pg/mL   Progesterone   Result Value Ref Range    Progesterone 0.3 ng/mL     Mon 5/20 (Day 8)   follitropin beta 900 unit/1.08 mL injection: Inject 300 Units once daily in the evening under the skin   menotropins recon soln injection: Inject 150 Units once daily in the evening under the skin   ganirelix syringe injection: Inject 250 mcg once daily in the morning under the skin    Tue 5/21 (Day 9)   follitropin beta 900 unit/1.08 mL injection: Inject 300 Units once daily in the evening under the skin   menotropins recon soln injection: Inject 150 Units once daily in the evening under the skin   ganirelix syringe injection: Inject 250 mcg once daily in the morning under the skin    Wed 5/22 (Day 10)   ganirelix syringe injection: Inject 250 mcg once daily in the morning under the skin    RTC in two days for Follicle scan and Estradiol and Progesterone.   Chhaya Gregg  05/20/2024  1:03 PM    [employer] message sent to patient.  Win Gutierrez 05/20/24 1:34 PM

## 2024-05-21 PROCEDURE — RXMED WILLOW AMBULATORY MEDICATION CHARGE

## 2024-05-22 ENCOUNTER — ANCILLARY PROCEDURE (OUTPATIENT)
Dept: ENDOCRINOLOGY | Facility: CLINIC | Age: 29
End: 2024-05-22
Payer: COMMERCIAL

## 2024-05-22 ENCOUNTER — TELEPHONE (OUTPATIENT)
Dept: ENDOCRINOLOGY | Facility: CLINIC | Age: 29
End: 2024-05-22

## 2024-05-22 ENCOUNTER — PHARMACY VISIT (OUTPATIENT)
Dept: PHARMACY | Facility: CLINIC | Age: 29
End: 2024-05-22
Payer: COMMERCIAL

## 2024-05-22 DIAGNOSIS — N97.9 FEMALE INFERTILITY: ICD-10-CM

## 2024-05-22 LAB
ESTRADIOL SERPL-MCNC: 3273 PG/ML
PROGEST SERPL-MCNC: 0.9 NG/ML

## 2024-05-22 PROCEDURE — 84144 ASSAY OF PROGESTERONE: CPT

## 2024-05-22 PROCEDURE — 36415 COLL VENOUS BLD VENIPUNCTURE: CPT

## 2024-05-22 PROCEDURE — 76857 US EXAM PELVIC LIMITED: CPT

## 2024-05-22 PROCEDURE — 82670 ASSAY OF TOTAL ESTRADIOL: CPT

## 2024-05-22 NOTE — PROGRESS NOTES
CYCLING NOTE    Here for US and/or lab monitoring for IVF #1 for Oocyte preservation; relevant findings reviewed.  Antagonist cycle, , , antagonist started on 5/17, day 10 of meds today.  Patient did not stay for discussion after monitoring,  Team will contact patient later today with results and plan.    Eun Benites  05/22/2024  8:33 AM    [facility] ([facility] PROVIDER NOTE - TRIGGER  Ultrasound and/or labs reviewed at [facility]. Patient ready for trigger.   Results for orders placed or performed in visit on 05/22/24 (from the past 96 hour(s))   Estradiol   Result Value Ref Range    Estradiol 3,273 pg/mL   Progesterone   Result Value Ref Range    Progesterone 0.9 ng/mL     Wed 5/22 (Day 10)   ganirelix 250 mcg/0.5 mL syringe injection: Inject 250 mcg once daily in the morning under the skin   leuprolide injection: Inject 4 mg if needed under the skin      Will trigger tonight at 830pm for retrieval at 830am on Friday .  and Lupron trigger - RTC in [facility]: one day for Progesterone and LH.     Post trigger follow up tomorrow: Progesterone and LH.   Ashley Early  05/22/2024  1:24 PM    My Chart message sent to patient with above noted plan.  Eun Benites 05/22/24 2:05 PM

## 2024-05-22 NOTE — TELEPHONE ENCOUNTER
Returned patient's call, she did not realize [facility] message was sent with her plan. Reviewed trigger plan and retrieval plan and preretrieval instructions,  patient has appt for tomorrow for lab work, denies any questions at this time.  Eun Benites 05/22/24 4:05 PM     1.65

## 2024-05-23 ENCOUNTER — PREP FOR PROCEDURE (OUTPATIENT)
Dept: ENDOCRINOLOGY | Facility: CLINIC | Age: 29
End: 2024-05-23

## 2024-05-23 ENCOUNTER — ANCILLARY PROCEDURE (OUTPATIENT)
Dept: ENDOCRINOLOGY | Facility: CLINIC | Age: 29
End: 2024-05-23
Payer: COMMERCIAL

## 2024-05-23 DIAGNOSIS — Z31.41 FERTILITY TESTING: ICD-10-CM

## 2024-05-23 DIAGNOSIS — N97.9 FEMALE INFERTILITY: ICD-10-CM

## 2024-05-23 LAB
LH SERPL-ACNC: 28.5 IU/L
PROGEST SERPL-MCNC: 2.9 NG/ML

## 2024-05-23 PROCEDURE — 84144 ASSAY OF PROGESTERONE: CPT

## 2024-05-23 PROCEDURE — 36415 COLL VENOUS BLD VENIPUNCTURE: CPT

## 2024-05-23 PROCEDURE — 83002 ASSAY OF GONADOTROPIN (LH): CPT

## 2024-05-23 RX ORDER — ONDANSETRON HYDROCHLORIDE 2 MG/ML
4 INJECTION, SOLUTION INTRAVENOUS AS NEEDED
Status: CANCELLED | OUTPATIENT
Start: 2024-05-23

## 2024-05-23 RX ORDER — OXYCODONE AND ACETAMINOPHEN 5; 325 MG/1; MG/1
1 TABLET ORAL EVERY 6 HOURS PRN
Status: CANCELLED | OUTPATIENT
Start: 2024-05-23

## 2024-05-23 RX ORDER — ACETAMINOPHEN 325 MG/1
650 TABLET ORAL ONCE AS NEEDED
Status: CANCELLED | OUTPATIENT
Start: 2024-05-23

## 2024-05-23 RX ORDER — HYDROCODONE BITARTRATE AND ACETAMINOPHEN 5; 325 MG/1; MG/1
1 TABLET ORAL ONCE AS NEEDED
Status: CANCELLED | OUTPATIENT
Start: 2024-05-23

## 2024-05-23 RX ORDER — KETOROLAC TROMETHAMINE 30 MG/ML
30 INJECTION, SOLUTION INTRAMUSCULAR; INTRAVENOUS ONCE AS NEEDED
Status: CANCELLED | OUTPATIENT
Start: 2024-05-23 | End: 2024-05-28

## 2024-05-23 RX ORDER — KETOROLAC TROMETHAMINE 30 MG/ML
30 INJECTION, SOLUTION INTRAMUSCULAR; INTRAVENOUS ONCE
Status: CANCELLED | OUTPATIENT
Start: 2024-05-23 | End: 2024-05-23

## 2024-05-23 RX ORDER — MORPHINE SULFATE 2 MG/ML
2 INJECTION, SOLUTION INTRAMUSCULAR; INTRAVENOUS AS NEEDED
Status: CANCELLED | OUTPATIENT
Start: 2024-05-23

## 2024-05-23 RX ORDER — CEFAZOLIN SODIUM 2 G/100ML
2 INJECTION, SOLUTION INTRAVENOUS ONCE
Status: CANCELLED | OUTPATIENT
Start: 2024-05-23 | End: 2024-05-23

## 2024-05-23 NOTE — PROGRESS NOTES
Patient presents for post trigger labs today.  Lupron trigger last night at 2030.  Eun Benites 05/23/24 9:53 AM      HUDDLE PROVIDER NOTE  Ultrasound and/or labs reviewed at [facility].   Results for orders placed or performed in visit on 05/23/24 (from the past 96 hour(s))   Luteinizing Hormone (LH)   Result Value Ref Range    Luteinizing Hormone 28.5 IU/L   Progesterone   Result Value Ref Range    Progesterone 2.9 ng/mL       RTC in one day for  oocyte retrieval .  D/w Dr. Sharath Gusman  05/23/2024  1:06 PM    My Chart message sent to patient with above noted plan.  Eun Benites 05/23/24 1:31 PM

## 2024-05-24 ENCOUNTER — ANESTHESIA (OUTPATIENT)
Dept: ENDOCRINOLOGY | Facility: CLINIC | Age: 29
End: 2024-05-24
Payer: COMMERCIAL

## 2024-05-24 ENCOUNTER — HOSPITAL ENCOUNTER (OUTPATIENT)
Dept: ENDOCRINOLOGY | Facility: CLINIC | Age: 29
Discharge: HOME | End: 2024-05-24
Payer: COMMERCIAL

## 2024-05-24 ENCOUNTER — ANESTHESIA EVENT (OUTPATIENT)
Dept: ENDOCRINOLOGY | Facility: CLINIC | Age: 29
End: 2024-05-24
Payer: COMMERCIAL

## 2024-05-24 VITALS
TEMPERATURE: 97.7 F | RESPIRATION RATE: 18 BRPM | BODY MASS INDEX: 34.45 KG/M2 | HEART RATE: 85 BPM | DIASTOLIC BLOOD PRESSURE: 50 MMHG | SYSTOLIC BLOOD PRESSURE: 120 MMHG | WEIGHT: 227.29 LBS | HEIGHT: 68 IN | OXYGEN SATURATION: 97 %

## 2024-05-24 DIAGNOSIS — Z31.83 ENCOUNTER FOR ASSISTED REPRODUCTIVE FERTILITY CYCLE: ICD-10-CM

## 2024-05-24 PROBLEM — G47.33 OSA (OBSTRUCTIVE SLEEP APNEA): Status: ACTIVE | Noted: 2023-03-01

## 2024-05-24 PROCEDURE — 2500000004 HC RX 250 GENERAL PHARMACY W/ HCPCS (ALT 636 FOR OP/ED): Performed by: ANESTHESIOLOGIST ASSISTANT

## 2024-05-24 PROCEDURE — 76948 ECHO GUIDE OVA ASPIRATION: CPT | Performed by: OBSTETRICS & GYNECOLOGY

## 2024-05-24 PROCEDURE — 7100000009 HC PHASE TWO TIME - INITIAL BASE CHARGE: Performed by: OBSTETRICS & GYNECOLOGY

## 2024-05-24 PROCEDURE — 3700000002 HC GENERAL ANESTHESIA TIME - EACH INCREMENTAL 1 MINUTE: Performed by: OBSTETRICS & GYNECOLOGY

## 2024-05-24 PROCEDURE — 89337 CRYOPRESERVATION OOCYTE(S): CPT | Performed by: OBSTETRICS & GYNECOLOGY

## 2024-05-24 PROCEDURE — 7100000010 HC PHASE TWO TIME - EACH INCREMENTAL 1 MINUTE: Performed by: OBSTETRICS & GYNECOLOGY

## 2024-05-24 PROCEDURE — 3700000001 HC GENERAL ANESTHESIA TIME - INITIAL BASE CHARGE: Performed by: OBSTETRICS & GYNECOLOGY

## 2024-05-24 RX ORDER — PROPOFOL 10 MG/ML
INJECTION, EMULSION INTRAVENOUS AS NEEDED
Status: DISCONTINUED | OUTPATIENT
Start: 2024-05-24 | End: 2024-05-24

## 2024-05-24 RX ORDER — KETOROLAC TROMETHAMINE 30 MG/ML
30 INJECTION, SOLUTION INTRAMUSCULAR; INTRAVENOUS ONCE AS NEEDED
Status: DISCONTINUED | OUTPATIENT
Start: 2024-05-24 | End: 2024-05-25 | Stop reason: HOSPADM

## 2024-05-24 RX ORDER — DOXYCYCLINE 100 MG/10ML
INJECTION, POWDER, LYOPHILIZED, FOR SOLUTION INTRAVENOUS AS NEEDED
Status: DISCONTINUED | OUTPATIENT
Start: 2024-05-24 | End: 2024-05-24

## 2024-05-24 RX ORDER — HYDROCODONE BITARTRATE AND ACETAMINOPHEN 5; 325 MG/1; MG/1
1 TABLET ORAL ONCE AS NEEDED
Status: DISCONTINUED | OUTPATIENT
Start: 2024-05-24 | End: 2024-05-25 | Stop reason: HOSPADM

## 2024-05-24 RX ORDER — ACETAMINOPHEN 325 MG/1
650 TABLET ORAL ONCE AS NEEDED
Status: DISCONTINUED | OUTPATIENT
Start: 2024-05-24 | End: 2024-05-25 | Stop reason: HOSPADM

## 2024-05-24 RX ORDER — ONDANSETRON HYDROCHLORIDE 2 MG/ML
4 INJECTION, SOLUTION INTRAVENOUS AS NEEDED
Status: DISCONTINUED | OUTPATIENT
Start: 2024-05-24 | End: 2024-05-25 | Stop reason: HOSPADM

## 2024-05-24 RX ORDER — FENTANYL CITRATE 50 UG/ML
INJECTION, SOLUTION INTRAMUSCULAR; INTRAVENOUS AS NEEDED
Status: DISCONTINUED | OUTPATIENT
Start: 2024-05-24 | End: 2024-05-24

## 2024-05-24 RX ORDER — KETOROLAC TROMETHAMINE 30 MG/ML
30 INJECTION, SOLUTION INTRAMUSCULAR; INTRAVENOUS ONCE
Status: DISCONTINUED | OUTPATIENT
Start: 2024-05-24 | End: 2024-05-25 | Stop reason: HOSPADM

## 2024-05-24 RX ORDER — MIDAZOLAM HYDROCHLORIDE 1 MG/ML
INJECTION, SOLUTION INTRAMUSCULAR; INTRAVENOUS AS NEEDED
Status: DISCONTINUED | OUTPATIENT
Start: 2024-05-24 | End: 2024-05-24

## 2024-05-24 RX ORDER — OXYCODONE AND ACETAMINOPHEN 5; 325 MG/1; MG/1
1 TABLET ORAL EVERY 6 HOURS PRN
Status: DISCONTINUED | OUTPATIENT
Start: 2024-05-24 | End: 2024-05-25 | Stop reason: HOSPADM

## 2024-05-24 RX ORDER — CEFAZOLIN SODIUM 2 G/100ML
2 INJECTION, SOLUTION INTRAVENOUS ONCE
Status: DISCONTINUED | OUTPATIENT
Start: 2024-05-24 | End: 2024-05-25 | Stop reason: HOSPADM

## 2024-05-24 RX ORDER — HYDROMORPHONE HYDROCHLORIDE 2 MG/ML
0.2 INJECTION, SOLUTION INTRAMUSCULAR; INTRAVENOUS; SUBCUTANEOUS
Status: DISCONTINUED | OUTPATIENT
Start: 2024-05-24 | End: 2024-05-25 | Stop reason: HOSPADM

## 2024-05-24 RX ORDER — KETOROLAC TROMETHAMINE 30 MG/ML
INJECTION, SOLUTION INTRAMUSCULAR; INTRAVENOUS AS NEEDED
Status: DISCONTINUED | OUTPATIENT
Start: 2024-05-24 | End: 2024-05-24

## 2024-05-24 RX ORDER — SODIUM CHLORIDE, SODIUM LACTATE, POTASSIUM CHLORIDE, CALCIUM CHLORIDE 600; 310; 30; 20 MG/100ML; MG/100ML; MG/100ML; MG/100ML
INJECTION, SOLUTION INTRAVENOUS CONTINUOUS PRN
Status: DISCONTINUED | OUTPATIENT
Start: 2024-05-24 | End: 2024-05-24

## 2024-05-24 RX ORDER — MORPHINE SULFATE 2 MG/ML
2 INJECTION, SOLUTION INTRAMUSCULAR; INTRAVENOUS AS NEEDED
Status: DISCONTINUED | OUTPATIENT
Start: 2024-05-24 | End: 2024-05-25 | Stop reason: HOSPADM

## 2024-05-24 RX ORDER — ONDANSETRON HYDROCHLORIDE 2 MG/ML
INJECTION, SOLUTION INTRAVENOUS AS NEEDED
Status: DISCONTINUED | OUTPATIENT
Start: 2024-05-24 | End: 2024-05-24

## 2024-05-24 RX ADMIN — KETOROLAC TROMETHAMINE 30 MG: 30 INJECTION, SOLUTION INTRAMUSCULAR; INTRAVENOUS at 09:08

## 2024-05-24 RX ADMIN — DOXYCYCLINE 100 MG: 100 INJECTION, POWDER, LYOPHILIZED, FOR SOLUTION INTRAVENOUS at 08:44

## 2024-05-24 RX ADMIN — SODIUM CHLORIDE, POTASSIUM CHLORIDE, SODIUM LACTATE AND CALCIUM CHLORIDE: 600; 310; 30; 20 INJECTION, SOLUTION INTRAVENOUS at 08:33

## 2024-05-24 RX ADMIN — FENTANYL CITRATE 50 MCG: 50 INJECTION, SOLUTION INTRAMUSCULAR; INTRAVENOUS at 08:37

## 2024-05-24 RX ADMIN — PROPOFOL 300 MG: 10 INJECTION, EMULSION INTRAVENOUS at 08:40

## 2024-05-24 RX ADMIN — DEXAMETHASONE SODIUM PHOSPHATE 4 MG: 4 INJECTION, SOLUTION INTRA-ARTICULAR; INTRALESIONAL; INTRAMUSCULAR; INTRAVENOUS; SOFT TISSUE at 09:08

## 2024-05-24 RX ADMIN — MIDAZOLAM 2 MG: 1 INJECTION INTRAMUSCULAR; INTRAVENOUS at 08:37

## 2024-05-24 RX ADMIN — ONDANSETRON 4 MG: 2 INJECTION INTRAMUSCULAR; INTRAVENOUS at 09:08

## 2024-05-24 RX ADMIN — FENTANYL CITRATE 50 MCG: 50 INJECTION, SOLUTION INTRAMUSCULAR; INTRAVENOUS at 08:40

## 2024-05-24 ASSESSMENT — PAIN SCALES - GENERAL
PAIN_LEVEL: 0
PAINLEVEL_OUTOF10: 0 - NO PAIN

## 2024-05-24 ASSESSMENT — PAIN - FUNCTIONAL ASSESSMENT
PAIN_FUNCTIONAL_ASSESSMENT: 0-10

## 2024-05-24 ASSESSMENT — COLUMBIA-SUICIDE SEVERITY RATING SCALE - C-SSRS
2. HAVE YOU ACTUALLY HAD ANY THOUGHTS OF KILLING YOURSELF?: NO
1. IN THE PAST MONTH, HAVE YOU WISHED YOU WERE DEAD OR WISHED YOU COULD GO TO SLEEP AND NOT WAKE UP?: NO
6. HAVE YOU EVER DONE ANYTHING, STARTED TO DO ANYTHING, OR PREPARED TO DO ANYTHING TO END YOUR LIFE?: NO

## 2024-05-24 NOTE — DISCHARGE INSTRUCTIONS
[facility]  [address]. [place]  [address]   Tel: [phone]   Fax: [phone]    Home Going Instructions after Egg Retrieval:     Activity:   We do not recommend exercise on the day of or the day after your egg retrieval.   You can resume normal activities in 2-3 days, but please avoid exercise that involves jumping or bouncing.  If you are not having a fresh embryo transfer, you will likely start your period within 1-2 weeks and can resume all normal exercise at that time.   You may resume intercourse one week after your retrieval.     Anesthesia:  Drink small amounts of liquids initially and then slowly increase your intake of food on the day of your procedure. Drinking fluids will keep your bowels regular.   Avoid foods that are sweet, spicy or hard to digest today.  If you feel nauseated, rest your stomach for one hour, and then try drinking clear liquids again.  You may take a stool softener or miralax/milk of magnesia to help with constipation that may occur after anesthesia.  Please make sure a responsible adult is with you for at least 24 hours after surgery and do not drive or make important decisions during this time. Anesthesia may affect your judgment, coordination, and reaction time.    Follow up:  ALWAYS follow up with your primary nurse a few days after your procedure to check in and make sure you know what your next steps are.     When to call your provider:  Vaginal bleeding that is more than a normal period that doesn't taper down within 6 hours.  Saturating a sanitary pad in 1-2 hours.  Any clots the size of an egg or bigger.  Fever of 100.4 or higher with chills.  Unusual or foul smelling discharge.  Discomfort from abdominal distension.     Notify your Physician's office if you develop any signs of Ovarian Hyperstimulation (OHSS):    -Abdominal bloating   -Rapid weight gain of 5-10 lbs. in 1-2 days  -Swelling in  hands and feet  -Severe abdominal pain  -Shortness of breath   -Difficulty urinating or decreased urinary output   -Diarrhea    -Persistent nausea and vomiting  -Dizziness     These signs and symptoms can occur for up to 2 weeks following your oocyte retrieval. Call [phone] to speak with a Physician or Nurse if you have any concerns.    Cindy Grant    7:37 AM

## 2024-05-24 NOTE — DISCHARGE SUMMARY
Patient discharged to home in stable condition via wheelchair to RIDE HOME: Friend's car. Able to ambulate to wheelchair with no complications. Discharge instructions given and concerns addressed.

## 2024-05-24 NOTE — ANESTHESIA POSTPROCEDURE EVALUATION
Patient: Lo Daniels    Procedure Summary       Date: 05/24/24 [facility]    Anesthesia Start: 0836 Anesthesia Stop: 0927    Procedure: EGG RETRIEVAL Diagnosis: Encounter for assisted reproductive fertility cycle    Scheduled Providers: Jignesh Zelaya MD; Darren Rivero MD; WENDY Kincaid Responsible Provider: Darren Rivero MD    Anesthesia Type: MAC ASA Status: 2            Anesthesia Type: MAC    Vitals Value Taken Time   /70 05/24/24 0920   Temp 36.5 °C (97.7 °F) 05/24/24 0920   Pulse 63 05/24/24 0920   Resp 14 05/24/24 0920   SpO2 97 % 05/24/24 0920       Anesthesia Post Evaluation    Patient location during evaluation: bedside  Patient participation: complete - patient cannot participate  Level of consciousness: awake  Pain score: 0  Pain management: adequate  Airway patency: patent  Cardiovascular status: acceptable  Respiratory status: acceptable  Hydration status: acceptable  Postoperative Nausea and Vomiting: none        No notable events documented.

## 2024-05-24 NOTE — ANESTHESIA PREPROCEDURE EVALUATION
Patient: Lo Daniels    Procedure Information       Date/Time: 05/24/24 0830    Scheduled providers: Jignesh Zelaya MD; Darren Rivero MD; WENDY Kincaid    Procedure: EGG RETRIEVAL    [facility]            Relevant Problems   Anesthesia (within normal limits)      Cardiac (within normal limits)      Pulmonary   (+) JORGE (obstructive sleep apnea) (Noncompliant w CPAP)      Neuro   (+) Anxiety and depression      GI (within normal limits)      Liver (within normal limits)      Endocrine   (+) Class 1 obesity with body mass index (BMI) of 34.0 to 34.9 in adult      Hematology (within normal limits)       Clinical information reviewed:   Tobacco  Allergies  Meds   Med Hx  Surg Hx   Fam Hx  Soc Hx        NPO Detail:  NPO/Void Status  Date of Last Liquid: 05/23/24  Time of Last Liquid: 2200  Date of Last Solid: 05/23/24  Time of Last Solid: 1930         Physical Exam    Airway  Mallampati: II     Cardiovascular    Dental    Pulmonary    Abdominal            Anesthesia Plan    History of general anesthesia?: yes  History of complications of general anesthesia?: no    ASA 2     MAC     intravenous induction   Anesthetic plan and risks discussed with patient.

## 2024-05-24 NOTE — PROGRESS NOTES
Patient ID: Lo Daniels is a 29 y.o. female.    Egg Retrieval    Date/Time: 5/24/2024 9:19 AM    Performed by: Jignesh Zelaya MD  Authorized by: BETTINA Mims-CNP    Consent:     Consent obtained:  Verbal and written    Consent given by:  Patient    Procedure risks and benefits discussed: yes      Patient questions answered: yes      Patient agrees, verbalizes understanding, and wants to proceed: yes      Educational handouts given: yes      Instructions and paperwork completed: yes    Procedure:     Anesthesia:  MAC and general (Patient with significant abdominal movement with respiration.  Patient required conversion from MAC anesthesia to General anesthesia with LMA to complete retrieval.  Future retrievals should be done under General.)    Pelvic exam performed: Yes      Cervix cleaned and prepped: Yes      Speculum placed in vagina: Yes      Tenaculum applied to cervix: No      Ultrasound guidance: Yes      Left ovary:  Follicle present    Right ovary:  Follicle present    Pt. post-ovulatory: No      Speculum type: Germania      Retrieval method: transvaginal      Needle inserted: Yes      Ovaries aspirated: Yes      Free fluid in pelvis: No    Post-procedure:     Patient tolerated procedure well: yes    Comments:      Preop diagnosis: Fertility preservation  Post op diagnosis: Same  Assistant: none    IV Fluids: 800  cc  EBL: 5  cc  UOP: Not recorded    Specimen: Oocytes  Complications: None    Number of Oocytes right ovary: 5   Ovarian accèss (right): Easy  Number of Oocytes left ovary: 6  Ovarian access (left): Easy  Endometrial thickness: n/a  Needle type: Single  Additional notes:       Attending Attestation: I was physically present for key and critical portions performed by the fellow. I reviewed the fellow's documentation and discussed the patient with the fellow. I agree with the fellow's medical decision making as documented in the fellow's note.   Jignesh Zelaya 05/24/24 9:19 AM

## 2024-05-24 NOTE — ANESTHESIA PROCEDURE NOTES
Airway  Date/Time: 5/24/2024 8:53 AM  Airway not difficult    Staffing  Performed: WENDY   Authorized by: Darren Rivero MD    Performed by: WENDY Kincaid  Patient location during procedure: OR    Indications and Patient Condition  Indications for airway management: anesthesia  Spontaneous ventilation: present  Sedation level: deep  Preoxygenated: yes  Mask difficulty assessment: 0 - not attempted    Final Airway Details  Final airway type: supraglottic airway      Successful airway: Size 4     Number of attempts at approach: 1

## 2024-05-28 DIAGNOSIS — F32.A ANXIETY AND DEPRESSION: ICD-10-CM

## 2024-05-28 DIAGNOSIS — F41.9 ANXIETY AND DEPRESSION: ICD-10-CM

## 2024-05-28 RX ORDER — FLUOXETINE HYDROCHLORIDE 20 MG/1
60 CAPSULE ORAL DAILY
Qty: 270 CAPSULE | Refills: 0 | Status: SHIPPED | OUTPATIENT
Start: 2024-05-28

## 2024-05-28 RX ORDER — BUPROPION HYDROCHLORIDE 300 MG/1
300 TABLET ORAL DAILY
Qty: 90 TABLET | Refills: 0 | Status: SHIPPED | OUTPATIENT
Start: 2024-05-28

## 2024-05-31 DIAGNOSIS — Z31.83 ENCOUNTER FOR ASSISTED REPRODUCTIVE FERTILITY CYCLE: ICD-10-CM

## 2024-05-31 DIAGNOSIS — N97.9 FEMALE INFERTILITY: ICD-10-CM

## 2024-05-31 RX ORDER — LEUPROLIDE ACETATE 1 MG/0.2ML
4 KIT SUBCUTANEOUS AS NEEDED
Qty: 1 KIT | Refills: 0 | Status: SHIPPED | OUTPATIENT
Start: 2024-05-31

## 2024-05-31 RX ORDER — CHORIONIC GONADOTROPIN 10000 UNIT
10000 KIT INTRAMUSCULAR ONCE AS NEEDED
Qty: 1 EACH | Refills: 0 | Status: SHIPPED | OUTPATIENT
Start: 2024-05-31

## 2024-05-31 RX ORDER — GANIRELIX ACETATE 250 UG/.5ML
250 INJECTION, SOLUTION SUBCUTANEOUS EVERY MORNING
Qty: 5 EACH | Refills: 2 | Status: SHIPPED | OUTPATIENT
Start: 2024-05-31

## 2024-06-04 ENCOUNTER — LAB (OUTPATIENT)
Dept: LAB | Facility: LAB | Age: 29
End: 2024-06-04
Payer: COMMERCIAL

## 2024-06-04 DIAGNOSIS — N97.9 FEMALE INFERTILITY: ICD-10-CM

## 2024-06-04 LAB
ESTRADIOL SERPL-MCNC: 90 PG/ML
LH SERPL-ACNC: 4.4 IU/L
PROGEST SERPL-MCNC: 0.3 NG/ML

## 2024-06-04 PROCEDURE — 83002 ASSAY OF GONADOTROPIN (LH): CPT

## 2024-06-04 PROCEDURE — 84144 ASSAY OF PROGESTERONE: CPT

## 2024-06-04 PROCEDURE — 82670 ASSAY OF TOTAL ESTRADIOL: CPT

## 2024-06-05 NOTE — PROGRESS NOTES
Patient presented to  lab yesterday afternoon for E2 LH and P4 to assess cycle for an OCP start to IVF stim #2. She currently has Mirena in place.  Eun Benites 06/05/24 8:35 AM    Monitoring patient, IUD in place, random labs yesterday for OCP lead in for IVF: P4 Level 0.3, LH 4.4, & E2- 90. Does not get regular cycles with IUD. Patient to repeat same labs 1 week, 6-. Plan to be communicated by RN. Plan agreed upon by Dr. Early. Zahra Smith, MARIA DE JESUS 06/05/24 10:27 AM     My Chart message sent to patient with above noted plan.  Eun Benites 06/05/24 11:28 AM

## 2024-06-11 NOTE — PROGRESS NOTES
Lab only appt to assess cycle for OCP start for #2 Oocyte freeze cycle.  Mirena IUD in place currently.  Had labs done 6/4 and repeated today at off-site  lab:    Team will contact patient later today with results and plan.    Win Gutierrez  06/11/2024  9:38 AM    Sent pt Mychart asking if she went for labs, but she did not reply and no results available at this time. Will follow up with pt tomorrow. Dr. Early aware.    Win Gutierrez 06/11/24 4:22 PM

## 2024-06-12 ENCOUNTER — LAB (OUTPATIENT)
Dept: LAB | Facility: LAB | Age: 29
End: 2024-06-12
Payer: COMMERCIAL

## 2024-06-12 DIAGNOSIS — N97.9 FEMALE INFERTILITY: ICD-10-CM

## 2024-06-12 LAB
ESTRADIOL SERPL-MCNC: 587 PG/ML
LH SERPL-ACNC: 6.3 IU/L
PROGEST SERPL-MCNC: 0.5 NG/ML

## 2024-06-12 PROCEDURE — 84144 ASSAY OF PROGESTERONE: CPT

## 2024-06-12 PROCEDURE — 83002 ASSAY OF GONADOTROPIN (LH): CPT

## 2024-06-12 PROCEDURE — 82670 ASSAY OF TOTAL ESTRADIOL: CPT

## 2024-06-12 NOTE — PROGRESS NOTES
Patient presents for to assess cycle for OCP start for #2 Oocyte freeze cycle.  Mirena IUD in place currently.  Had labs done 6/4 and repeated today at off-site  lab.  Eun Benites 06/12/24 7:34 AM    Pt sent My Chart message that she forgot to go for labs but just went now. Notified her we will call her tomorrow with results and plan. Win Gutierrez 06/12/24 4:00 PM    Patient called the office, she forgot to do her blood work earlier today, will go at this time. Placed on noon huddle for tomorrow.  Eun Benites 06/12/24 4:49 PM

## 2024-06-13 NOTE — PROGRESS NOTES
Patient presents for to assess cycle for OCP start for #2 Oocyte freeze cycle.  Mirena IUD in place currently.  Had labs done 6/4 and repeated late yesterday off-site  lab.  Eun Benites 06/13/24 9:42 AM    Monitoring patient, LMP: Mirena IUD in place, trying to start OCPs for an IVF cycle set up: Labs from yesterday: E2 elevated at 587, P4 Level- not ovulatory @ 0.5, LH 6.3. Patient to repeat labs in 1 week. Plan to be communicated by RN. Plan agreed upon by Dr. Early. Zahra Smith, MARIA DE JESUS 06/13/24 12:13 PM     My Chart message sent to patient with above noted plan.  Eun Benites 06/13/24 2:09 PM

## 2024-06-19 NOTE — PROGRESS NOTES
Patient presents for to assess cycle for OCP start for #2 Oocyte freeze cycle.  Mirena IUD in place currently.  Labs done 6/4/24, repeated 6/12/24 and noted to have elevated E2 on 6/12/24.  Eun Benites 06/19/24 10:17 AM    Patient has not gone for blood work yet. [employer] message sent reminding her to complete these tests. Added to noon huddle for tomorrow.  Eun Benites 06/19/24 3:36 PM

## 2024-06-20 ENCOUNTER — LAB (OUTPATIENT)
Dept: LAB | Facility: LAB | Age: 29
End: 2024-06-20
Payer: COMMERCIAL

## 2024-06-20 ENCOUNTER — APPOINTMENT (OUTPATIENT)
Dept: PRIMARY CARE | Facility: CLINIC | Age: 29
End: 2024-06-20
Payer: COMMERCIAL

## 2024-06-20 VITALS
BODY MASS INDEX: 35.58 KG/M2 | HEART RATE: 75 BPM | DIASTOLIC BLOOD PRESSURE: 68 MMHG | SYSTOLIC BLOOD PRESSURE: 116 MMHG | OXYGEN SATURATION: 96 % | HEIGHT: 68 IN | WEIGHT: 234.8 LBS | TEMPERATURE: 97.1 F

## 2024-06-20 DIAGNOSIS — F32.A ANXIETY AND DEPRESSION: ICD-10-CM

## 2024-06-20 DIAGNOSIS — Z31.41 FERTILITY TESTING: ICD-10-CM

## 2024-06-20 DIAGNOSIS — E66.09 CLASS 2 OBESITY DUE TO EXCESS CALORIES WITHOUT SERIOUS COMORBIDITY WITH BODY MASS INDEX (BMI) OF 35.0 TO 35.9 IN ADULT: ICD-10-CM

## 2024-06-20 DIAGNOSIS — N97.9 FEMALE INFERTILITY: ICD-10-CM

## 2024-06-20 DIAGNOSIS — G47.33 OSA (OBSTRUCTIVE SLEEP APNEA): Primary | ICD-10-CM

## 2024-06-20 DIAGNOSIS — F41.9 ANXIETY AND DEPRESSION: ICD-10-CM

## 2024-06-20 PROBLEM — E66.812 CLASS 2 OBESITY WITHOUT SERIOUS COMORBIDITY WITH BODY MASS INDEX (BMI) OF 35.0 TO 35.9 IN ADULT: Status: ACTIVE | Noted: 2023-03-01

## 2024-06-20 LAB
ESTRADIOL SERPL-MCNC: 698 PG/ML
LH SERPL-ACNC: 2.7 IU/L
PROGEST SERPL-MCNC: 0.5 NG/ML

## 2024-06-20 PROCEDURE — 84144 ASSAY OF PROGESTERONE: CPT

## 2024-06-20 PROCEDURE — 36415 COLL VENOUS BLD VENIPUNCTURE: CPT

## 2024-06-20 PROCEDURE — 3008F BODY MASS INDEX DOCD: CPT | Performed by: NURSE PRACTITIONER

## 2024-06-20 PROCEDURE — 99213 OFFICE O/P EST LOW 20 MIN: CPT | Performed by: NURSE PRACTITIONER

## 2024-06-20 PROCEDURE — 82670 ASSAY OF TOTAL ESTRADIOL: CPT

## 2024-06-20 PROCEDURE — 83002 ASSAY OF GONADOTROPIN (LH): CPT

## 2024-06-20 PROCEDURE — 1036F TOBACCO NON-USER: CPT | Performed by: NURSE PRACTITIONER

## 2024-06-20 RX ORDER — BUPROPION HYDROCHLORIDE 300 MG/1
300 TABLET ORAL DAILY
Qty: 90 TABLET | Refills: 1 | Status: SHIPPED | OUTPATIENT
Start: 2024-06-20

## 2024-06-20 RX ORDER — FLUOXETINE HYDROCHLORIDE 20 MG/1
60 CAPSULE ORAL DAILY
Qty: 270 CAPSULE | Refills: 1 | Status: SHIPPED | OUTPATIENT
Start: 2024-06-20

## 2024-06-20 ASSESSMENT — PATIENT HEALTH QUESTIONNAIRE - PHQ9
SUM OF ALL RESPONSES TO PHQ9 QUESTIONS 1 AND 2: 0
1. LITTLE INTEREST OR PLEASURE IN DOING THINGS: NOT AT ALL
2. FEELING DOWN, DEPRESSED OR HOPELESS: NOT AT ALL

## 2024-06-20 ASSESSMENT — ANXIETY QUESTIONNAIRES
2. NOT BEING ABLE TO STOP OR CONTROL WORRYING: NOT AT ALL
1. FEELING NERVOUS, ANXIOUS, OR ON EDGE: NOT AT ALL
4. TROUBLE RELAXING: NOT AT ALL
3. WORRYING TOO MUCH ABOUT DIFFERENT THINGS: NOT AT ALL
7. FEELING AFRAID AS IF SOMETHING AWFUL MIGHT HAPPEN: NOT AT ALL
6. BECOMING EASILY ANNOYED OR IRRITABLE: NOT AT ALL
GAD7 TOTAL SCORE: 0
5. BEING SO RESTLESS THAT IT IS HARD TO SIT STILL: NOT AT ALL

## 2024-06-20 ASSESSMENT — ENCOUNTER SYMPTOMS
DEPRESSION: 0
OCCASIONAL FEELINGS OF UNSTEADINESS: 0
LOSS OF SENSATION IN FEET: 0

## 2024-06-20 NOTE — PROGRESS NOTES
Patient presents to outside  laboratory for lab work to assess cycle for OCP start for IVF egg freeze #2. Patient has Mirena IUD in place currently. Patient had labs drawn on 6/4 as well as 6/12; plan at that time was to r/p in one week as E2 was elevated.       06/20/24 at 8:33 AM - Isha Valdez RN    [facility] PROVIDER NOTE- PROGESTERONE CHECK  Ultrasound and/or labs reviewed at [facility].   Results for orders placed or performed in visit on 06/20/24 (from the past 96 hour(s))   Luteinizing Hormone   Result Value Ref Range    Luteinizing Hormone 2.7 IU/L   Progesterone   Result Value Ref Range    Progesterone 0.5 ng/mL   Estradiol   Result Value Ref Range    Estradiol 698 pg/mL         Recommend follicle scan tomorrow to assess for possible cyst  If cyst present can consider 1 of two options:  Start OCPs and wait for cyst to naturally resolve  Start OCPs and trigger with hCG to ovulate if it is believed cyst if active follicle    Nikki Meredith  06/20/2024  1:27 PM    Telephone call made to patient to discuss MD plan above. Patient agreeable to do ultrasound/lab work. Patient unable to get in for ultrasound tomorrow, patient given OK to schedule for Saturday by Dr. Meredith. Per Dr. Meredith, patient to have: follicle scan, E2, P4, and LH on Saturday.    06/20/24 at 4:00 PM - Isha Valdez RN

## 2024-06-20 NOTE — PROGRESS NOTES
"Subjective   Patient ID: Lo Daniels is a 29 y.o. female who presents for Follow-up (Cpap )    Presents for follow up for JORGE  Sleep study complete 11/2022, diagnosed with mild sleep apnea at that time.  Had since then lost weight and is now gaining again.  Currently exercising - cardio 3 times a week  Has adjusted diet  Is still not sleeping well  Is now waking feeling not well rested  Symptoms have been worse with gaining weight back  Has had increased stress with working with fertility, upcoming move to [place] later this year.      Review of Systems    Objective     /68   Pulse 75   Temp 36.2 °C (97.1 °F)   Ht 1.727 m (5' 8\")   Wt 107 kg (234 lb 12.8 oz)   SpO2 96%   BMI 35.70 kg/m²      Physical Exam  Vitals and nursing note reviewed.   Constitutional:       General: She is not in acute distress.     Appearance: Normal appearance. She is obese.   Cardiovascular:      Rate and Rhythm: Normal rate and regular rhythm.      Heart sounds: Normal heart sounds. No murmur heard.  Pulmonary:      Effort: Pulmonary effort is normal. No respiratory distress.      Breath sounds: Normal breath sounds.         Assessment/Plan   Diagnoses and all orders for this visit:  JORGE (obstructive sleep apnea)  Anxiety and depression  -     FLUoxetine (PROzac) 20 mg capsule; Take 3 capsules (60 mg) by mouth once daily.  -     buPROPion XL (Wellbutrin XL) 300 mg 24 hr tablet; Take 1 tablet (300 mg) by mouth once daily.  Class 2 obesity due to excess calories without serious comorbidity with body mass index (BMI) of 35.0 to 35.9 in adult      Patient Instructions   Sleep study shows mild sleep apnea  Plan to initiate CPAP  Will watch symptoms with CPAP use       "

## 2024-06-22 ENCOUNTER — ANCILLARY PROCEDURE (OUTPATIENT)
Dept: ENDOCRINOLOGY | Facility: CLINIC | Age: 29
End: 2024-06-22
Payer: COMMERCIAL

## 2024-06-22 ENCOUNTER — APPOINTMENT (OUTPATIENT)
Dept: LAB | Facility: LAB | Age: 29
End: 2024-06-22
Payer: COMMERCIAL

## 2024-06-22 DIAGNOSIS — N97.9 FEMALE INFERTILITY: ICD-10-CM

## 2024-06-22 LAB
ESTRADIOL SERPL-MCNC: 604 PG/ML
LH SERPL-ACNC: 4.3 IU/L
PROGEST SERPL-MCNC: 0.3 NG/ML

## 2024-06-22 PROCEDURE — 76857 US EXAM PELVIC LIMITED: CPT

## 2024-06-22 NOTE — PROGRESS NOTES
CYCLING NOTE    Here for US and/or lab monitoring to assess for possible cyst; relevant findings reviewed.  Patient has had E2, LH, P4 blood work done 3 times to start OCP lead in for another IVF cycle (Mirena in place) and has been non-ovulatory. Last values obtained on 6/20/24 and plan at that time if cyst present was to:  Start OCPs and wait for cyst to naturally resolve  Start OCPs and trigger with hCG to ovulate if it is believed cyst if active follicle    Team will contact patient later today with results and plan.    Eun Benites  06/22/2024  8:56 AM  [facility] PROVIDER NOTE  Ultrasound and/or labs reviewed at [facility]   Results for orders placed or performed in visit on 06/20/24 (from the past 96 hour(s))   Luteinizing Hormone   Result Value Ref Range    Luteinizing Hormone 2.7 IU/L   Progesterone   Result Value Ref Range    Progesterone 0.5 ng/mL   Estradiol   Result Value Ref Range    Estradiol 698 pg/mL     No filed medication doses on the treatment plan for today or future days.    Plan-  Start OCPs   Await labs from today- if LH/P4 not elevated will recommend hCG trigger and check P4 level in 1 week  Plan to re-baseline in 2 weeks to start IVF if cyst has resolved  NOTE- above plan is pending E2/LH/P4 which is in progress      Nikki Meredith  06/22/2024  9:35 AM    Called Lo with labs: 604, LH 4.3, P4 0.3. Will have patient trigger with HCG at home today (confirmed patient has it) and then will check P4 on Friday (~1 week). Pt to start OCP today as well.    Chhaya Gregg MD PGY 5  Reproductive Endocrinology and Infertility Fellow

## 2024-06-25 ENCOUNTER — TELEPHONE (OUTPATIENT)
Dept: ENDOCRINOLOGY | Facility: CLINIC | Age: 29
End: 2024-06-25
Payer: COMMERCIAL

## 2024-06-25 NOTE — TELEPHONE ENCOUNTER
Returned patient call regarding US. Patient instructed per Dr. Gregg's note from 6/22 patient is just to have P4 level drawn on Friday. Patient confirmed she had started OCP on 6/22. Patient states she would like to come to office for blood work. Patient instructed after the providers evaluate her progesterone she will then be set up for another baseline. Patient agreeable. Patient denies further questions/concerns. Patient transferred to front to schedule appointment.   MICHAEL SANTAMARIA on 6/25/24 at 12:44 PM.

## 2024-06-25 NOTE — TELEPHONE ENCOUNTER
Reason for call: Patient is calling to talk to a nurse to check if she has to have an US. Please call her she said she spoke to Dr Gregg on Saturday.  Notes:

## 2024-06-28 ENCOUNTER — ANCILLARY PROCEDURE (OUTPATIENT)
Dept: ENDOCRINOLOGY | Facility: CLINIC | Age: 29
End: 2024-06-28
Payer: COMMERCIAL

## 2024-06-28 DIAGNOSIS — Z01.812 PRE-PROCEDURE LAB EXAM: ICD-10-CM

## 2024-06-28 DIAGNOSIS — N97.9 FEMALE INFERTILITY: ICD-10-CM

## 2024-06-28 LAB — PROGEST SERPL-MCNC: 11.7 NG/ML

## 2024-06-28 PROCEDURE — 36415 COLL VENOUS BLD VENIPUNCTURE: CPT

## 2024-06-28 NOTE — PROGRESS NOTES
CYCLING NOTE    Here for lab monitoring; relevant findings reviewed.    Patient presents to clinic today for progesterone level prior to IVF start. Patient triggered with HCG on 6/22/24 and began OCPs at that time. IVF #2 for egg preservation. Patient has Mirena IUD in place currently. Protocol OCP Antagonist /Menopur 150.    Component      Latest Ref Rng 6/28/2024   Progesterone      ng/mL 11.7      Team will contact patient later today with results and plan.    Isha Valdez  06/28/2024  8:11 AM      Progesterone confirms ovulation, OK to continue OCP.  Plan to proceed with IVF cycle as planned.    Jignesh Zelyaa MD  Reproductive Endocrinology and Infertility   [facility]     Telephone call made to patient with MD plan above. Patient agreeable to plan but states she will be moving out of town on 7/15/24. This RN discussed with patient that if she started OCP on 6/22, and we need her to be on for a minium of 10 days, that the soonest she could baseline would be Monday 7/1. This RN informed patient that she would have to go OCP free for three days prior to starting any IVF medications, and would then be on the medications for 9-10 days prior to an egg retrieval. Patient agreeable to see how she looks on baseline scan on 7/1 to get a better idea of a timeline for IVF medication/stimulation. Patient states if she does not think the timeline will work out based off of 7/1 appointment, she will cancel this IVF cycle as the timing is already too close. All other questions answered. Patient transferred to  to schedule for 7/1.    06/28/24 at 2:23 PM - Isha Valdez RN

## 2024-07-01 ENCOUNTER — APPOINTMENT (OUTPATIENT)
Dept: ENDOCRINOLOGY | Facility: CLINIC | Age: 29
End: 2024-07-01
Payer: COMMERCIAL

## 2024-07-02 ENCOUNTER — ANCILLARY PROCEDURE (OUTPATIENT)
Dept: ENDOCRINOLOGY | Facility: CLINIC | Age: 29
End: 2024-07-02
Payer: COMMERCIAL

## 2024-07-02 DIAGNOSIS — N97.9 FEMALE INFERTILITY: ICD-10-CM

## 2024-07-02 DIAGNOSIS — Z01.812 PRE-PROCEDURE LAB EXAM: ICD-10-CM

## 2024-07-02 LAB
ESTRADIOL SERPL-MCNC: 170 PG/ML
HCT VFR BLD AUTO: 35.8 % (ref 36–46)
PROGEST SERPL-MCNC: 1.6 NG/ML

## 2024-07-02 PROCEDURE — 36415 COLL VENOUS BLD VENIPUNCTURE: CPT

## 2024-07-02 PROCEDURE — 84144 ASSAY OF PROGESTERONE: CPT

## 2024-07-02 PROCEDURE — 85014 HEMATOCRIT: CPT

## 2024-07-02 PROCEDURE — 82670 ASSAY OF TOTAL ESTRADIOL: CPT

## 2024-07-02 PROCEDURE — 76857 US EXAM PELVIC LIMITED: CPT

## 2024-07-02 PROCEDURE — 76857 US EXAM PELVIC LIMITED: CPT | Performed by: OBSTETRICS & GYNECOLOGY

## 2024-07-02 NOTE — PROGRESS NOTES
CLAIRE NOTE - IVF STIM BASELINE  Patient presents for baseline ultrasound and/or labs.    Treatment protocol: Antagonist//hmg 150  Lead in: OCP (also has Mirena in place)  Start date for lead in: 6.22.24  Last estrace/OCP date: 6.30.24 (Sunday)  PGT-A/M? No  PGT order scanned into Epic, confirmed by: n/a  Plan to freeze: mature oocytes    [employer] forms/out waiver complete:  Yes    Does patient have medications onhand?  Yes  [facility]arm[facility]: [facility]      Boarding pass signed off:  Yes    Medically complex on boarding pass:   no    If indicated, is PAT consult complete?  N/A    SPERM:  na    Additional details: There is a time constraint with this egg freezing cycle, which is why she stopped OCPs before baseline. Pt is moving 7/15/24 to [place]. Can delay move by a couple days if needed.  Win Gutierrez  07/02/2024  8:46 AM

## 2024-07-07 ENCOUNTER — APPOINTMENT (OUTPATIENT)
Dept: ENDOCRINOLOGY | Facility: CLINIC | Age: 29
End: 2024-07-07
Payer: COMMERCIAL

## 2024-07-08 ENCOUNTER — ANCILLARY PROCEDURE (OUTPATIENT)
Dept: ENDOCRINOLOGY | Facility: CLINIC | Age: 29
End: 2024-07-08
Payer: COMMERCIAL

## 2024-07-08 DIAGNOSIS — N97.9 FEMALE INFERTILITY: ICD-10-CM

## 2024-07-08 LAB — ESTRADIOL SERPL-MCNC: 82 PG/ML

## 2024-07-08 PROCEDURE — 36415 COLL VENOUS BLD VENIPUNCTURE: CPT

## 2024-07-08 PROCEDURE — 76857 US EXAM PELVIC LIMITED: CPT

## 2024-07-08 NOTE — PROGRESS NOTES
CYCLING NOTE    Here for US and/or lab monitoring; relevant findings reviewed. Patient is 29 years old. Patient of Dr. Meredith. AMH- 2.124. Patient is here for IVF Cycle #2 for Egg preservation. Patient has Mirena IUD currently in place. Protocol- Antagonist//hmg 150. Patient is day 4 of stimulation.     Patient did not stay for discussion after monitoring,  Team will contact patient later today with results and plan.    Michael Chiu  07/08/2024  9:08 AM    Attempted to reach patient with plan from meeting. Detailed VM as well as my chart message sent to patient with plan. Patient instructed to continue current doses and RTC on Wednesday 7/10/24. Patient instructed to call office to schedule and with any questions/concerns. Message sent to  for awareness.   MICHAEL CHIU on 7/8/24 at 1:47 PM.

## 2024-07-09 NOTE — PROGRESS NOTES
[facility]  Hand and Upper Extremity Service  Follow up visit         Follow up for: Bilateral wrists     Interval History: Received bilateral De Quervain's wrist injections on last visit and received relief for about 6-8 weeks before she noticed symptoms beginning to return. 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               Past medical history, medications, allergies, surgical history and review of systems are reviewed and otherwise unchanged when compared to last visit on 4/25/24         Examination:  Constitutional: Oriented to person, place, and time.  Appears well-developed and well-nourished.  Head: Normocephalic and atraumatic.  Eyes: Pupils are equal, round, and reactive to light.  Cardiovascular: Intact distal pulses.  Pulmonary/Chest/Breast: Effort normal. No respiratory distress.  Neurological: Alert and oriented to person, place, and time.  Skin: Skin is warm and dry.  Psychiatric: normal mood and affect.  Behavior is normal.  Musculoskeletal: Bilateral hands reveal tenderness over radial styloids. Positive Finkelstein maneuvers. No swelling or deformities appreciated.       Personal Interpretation of Diagnostic studies: No new images obtained       Impression: Bilateral De Quervain's tenosynovitis       Plan: She got bilateral first dorsal De Quervain's injections and was given information on how to locate a hand surgeon in [place], [place] for further treatment which should likely include surgical treatment in the future.       In Office Procedures Performed: Bilateral first dorsal compartment injections   Hand / UE Inj/Asp: bilateral extensor compartment 1 for de Quervain's tenosynovitis on 7/11/2024 3:43 PM  Indications: tendon swelling and pain  Details: 25 G needle, radial approach  Medications (Right): 0.5 mL lidocaine 10 mg/mL (1 %); 20 mg triamcinolone acetonide 40 mg/mL  Medications (Left): 0.5  mL lidocaine 10 mg/mL (1 %); 20 mg triamcinolone acetonide 40 mg/mL  Outcome: tolerated well, no immediate complications  Procedure, treatment alternatives, risks and benefits explained, specific risks discussed. Immediately prior to procedure a time out was called to verify the correct patient, procedure, equipment, support staff and site/side marked as required. Patient was prepped and draped in the usual sterile fashion.             Follow up: As needed             Thanh Feliciano MD  [facility]  Department of Orthopaedic Surgery  Hand and Upper Extremity Reconstruction    Scribe Attestation  By signing my name below, I, Meg Pardo , Scribe   attest that this documentation has been prepared under the direction and in the presence of Dr. Thanh Feliciano.    Dictation performed with the use of voice recognition software.  Syntax and grammatical errors may exist.

## 2024-07-10 ENCOUNTER — ANCILLARY PROCEDURE (OUTPATIENT)
Dept: ENDOCRINOLOGY | Facility: CLINIC | Age: 29
End: 2024-07-10
Payer: COMMERCIAL

## 2024-07-10 DIAGNOSIS — N97.9 FEMALE INFERTILITY: ICD-10-CM

## 2024-07-10 LAB — ESTRADIOL SERPL-MCNC: 209 PG/ML

## 2024-07-10 PROCEDURE — 76857 US EXAM PELVIC LIMITED: CPT

## 2024-07-10 PROCEDURE — 36415 COLL VENOUS BLD VENIPUNCTURE: CPT

## 2024-07-10 NOTE — PROGRESS NOTES
CYCLING NOTE    Here for US and/or lab monitoring for IVF #2 for egg preservation; relevant findings reviewed.  Protocol: ANTAGONIST  , today is day 6 of medications.   Patient stayed for nurse visit. Pain is 0/10. Patient states she is moving out of [place] and must leave on 7/17/24 at the latest. Is aware that may need to abandon this cycle due to timing and that Sunday monitoring visit will likely show status of this cycle better for decision making purposes.   Team will contact patient later today with results and plan.    Eun Benites  07/10/2024  8:52 AM    Reviewed by a provider in noon meeting. My Chart message sent to patient with noted plan.  Eun Benites 07/10/24 12:54 PM

## 2024-07-11 ENCOUNTER — OFFICE VISIT (OUTPATIENT)
Dept: ORTHOPEDIC SURGERY | Facility: CLINIC | Age: 29
End: 2024-07-11
Payer: COMMERCIAL

## 2024-07-11 VITALS — WEIGHT: 234 LBS | BODY MASS INDEX: 35.46 KG/M2 | HEIGHT: 68 IN

## 2024-07-11 DIAGNOSIS — M65.4 DE QUERVAIN'S TENOSYNOVITIS: Primary | ICD-10-CM

## 2024-07-11 DIAGNOSIS — N97.9 FEMALE INFERTILITY: ICD-10-CM

## 2024-07-11 PROCEDURE — 20550 NJX 1 TENDON SHEATH/LIGAMENT: CPT | Mod: 50 | Performed by: ORTHOPAEDIC SURGERY

## 2024-07-11 PROCEDURE — 99213 OFFICE O/P EST LOW 20 MIN: CPT | Performed by: ORTHOPAEDIC SURGERY

## 2024-07-11 PROCEDURE — 2500000005 HC RX 250 GENERAL PHARMACY W/O HCPCS: Performed by: ORTHOPAEDIC SURGERY

## 2024-07-11 PROCEDURE — 2500000004 HC RX 250 GENERAL PHARMACY W/ HCPCS (ALT 636 FOR OP/ED): Performed by: ORTHOPAEDIC SURGERY

## 2024-07-11 RX ORDER — CHORIONIC GONADOTROPIN 10000 UNIT
10000 KIT INTRAMUSCULAR ONCE AS NEEDED
Qty: 1 EACH | Refills: 0 | Status: SHIPPED | OUTPATIENT
Start: 2024-07-11

## 2024-07-11 RX ORDER — LIDOCAINE HYDROCHLORIDE 10 MG/ML
0.5 INJECTION INFILTRATION; PERINEURAL
Status: COMPLETED | OUTPATIENT
Start: 2024-07-11 | End: 2024-07-11

## 2024-07-11 RX ORDER — TRIAMCINOLONE ACETONIDE 40 MG/ML
20 INJECTION, SUSPENSION INTRA-ARTICULAR; INTRAMUSCULAR
Status: COMPLETED | OUTPATIENT
Start: 2024-07-11 | End: 2024-07-11

## 2024-07-11 RX ORDER — CHORIONIC GONADOTROPIN 10000 UNIT
10000 KIT INTRAMUSCULAR ONCE AS NEEDED
Qty: 1 EACH | Refills: 0 | Status: SHIPPED | OUTPATIENT
Start: 2024-07-11 | End: 2024-07-11 | Stop reason: SDUPTHER

## 2024-07-12 ENCOUNTER — ANCILLARY PROCEDURE (OUTPATIENT)
Dept: ENDOCRINOLOGY | Facility: CLINIC | Age: 29
End: 2024-07-12
Payer: COMMERCIAL

## 2024-07-12 DIAGNOSIS — N97.9 FEMALE INFERTILITY: ICD-10-CM

## 2024-07-12 LAB — ESTRADIOL SERPL-MCNC: 715 PG/ML

## 2024-07-12 PROCEDURE — 76857 US EXAM PELVIC LIMITED: CPT

## 2024-07-12 PROCEDURE — 36415 COLL VENOUS BLD VENIPUNCTURE: CPT

## 2024-07-12 NOTE — PROGRESS NOTES
CYCLING NOTE    Here for US and/or lab monitoring; relevant findings reviewed. 29 year old patient of Dr. Meredith with [facility].124 is here to monitor for egg preservation cycle #1 after 7 days of injections. Antagonist protocol -- current dosing is / hmg 75.     Patient is moving to [place] on 7/17 and therefore would need to trigger on Sunday for a Tuesday retrieval. Patient understands current cycle progress and would like to plan to return on Sunday for final call on whether this will be able to proceed or cancel due to time constraint.     Patient stayed for nurse visit. Pain is 0/10  Team will contact patient later today with results and plan.    KENDRA SEGURA JATINDER  07/12/2024  9:16 AM    MTG Plan    Unlikely to be ready on Sunday for Tuesday retrieval. Okay to continue stim if patient desires to continue    Chhaya Gregg MD PGY 5  Reproductive Endocrinology and Infertility Fellow      Alejandrat sent to patient regarding plan, let her know we are unsure if she will be ready Sunday but we can make a decision then, told her to be sure to have her trigger for the weekend. Will start ganirelix today.    07/12/24 at 2:02 PM - Ashley Oneal RN    .

## 2024-07-14 ENCOUNTER — ANCILLARY PROCEDURE (OUTPATIENT)
Dept: ENDOCRINOLOGY | Facility: CLINIC | Age: 29
End: 2024-07-14
Payer: COMMERCIAL

## 2024-07-14 DIAGNOSIS — N97.9 FEMALE INFERTILITY: ICD-10-CM

## 2024-07-14 LAB
ESTRADIOL SERPL-MCNC: 969 PG/ML
PROGEST SERPL-MCNC: 0.5 NG/ML

## 2024-07-14 PROCEDURE — 76857 US EXAM PELVIC LIMITED: CPT

## 2024-07-14 PROCEDURE — 36415 COLL VENOUS BLD VENIPUNCTURE: CPT

## 2024-07-14 PROCEDURE — 76857 US EXAM PELVIC LIMITED: CPT | Performed by: OBSTETRICS & GYNECOLOGY

## 2024-07-14 PROCEDURE — 84144 ASSAY OF PROGESTERONE: CPT

## 2024-07-14 PROCEDURE — 82670 ASSAY OF TOTAL ESTRADIOL: CPT

## 2024-07-14 RX ORDER — MEDROXYPROGESTERONE ACETATE 10 MG/1
10 TABLET ORAL DAILY
Qty: 10 TABLET | Refills: 0 | Status: SHIPPED | OUTPATIENT
Start: 2024-07-14 | End: 2024-07-24

## 2024-07-14 NOTE — PROGRESS NOTES
RN CYCLING NOTE     Here for US and/or lab monitoring; relevant findings reviewed. 29 year old patient of Dr. Meredith with [facility] 2.124 is here to monitor for egg preservation cycle #1 after 9 days of injections. Antagonist protocol -- current dosing is / hmg 75.      Patient is moving to [place] on 7/17 and therefore would need to trigger on Today for a Tuesday retrieval.     Team will call pt today with results and plan.   Madyson Escobedo 07/14/24 09:12 AM    TC to pt - reviewed options per DR Meredith - ideally, pt back in 2 days for monitoring; could trigger today if pt agreeable, understanding that may not get any eggs; Pt states that she has to be in [place] for commitments by this Friday and is driving out there. Pt states that she does not want to go to retrieval and that she is going to cancel this cycle.   Reviewed also with DR. Jefferson - pt to discontinue all meds and take Provera 10 mg x 10 days.  Pt is agreeable to this plan.  Madyson Escobedo 07/14/24 11:15 AM

## 2024-11-09 ENCOUNTER — OFFICE VISIT (OUTPATIENT)
Dept: URGENT CARE | Facility: CLINIC | Age: 29
End: 2024-11-09
Payer: COMMERCIAL

## 2024-11-09 ENCOUNTER — APPOINTMENT (OUTPATIENT)
Dept: RADIOLOGY | Facility: IMAGING CENTER | Age: 29
End: 2024-11-09
Attending: NURSE PRACTITIONER
Payer: COMMERCIAL

## 2024-11-09 VITALS
TEMPERATURE: 97.5 F | SYSTOLIC BLOOD PRESSURE: 112 MMHG | BODY MASS INDEX: 36.34 KG/M2 | HEIGHT: 68 IN | OXYGEN SATURATION: 99 % | WEIGHT: 239.8 LBS | RESPIRATION RATE: 14 BRPM | DIASTOLIC BLOOD PRESSURE: 76 MMHG | HEART RATE: 74 BPM

## 2024-11-09 DIAGNOSIS — R05.1 ACUTE COUGH: ICD-10-CM

## 2024-11-09 DIAGNOSIS — J20.9 BRONCHITIS, ACUTE, WITH BRONCHOSPASM: ICD-10-CM

## 2024-11-09 PROCEDURE — 3078F DIAST BP <80 MM HG: CPT | Performed by: NURSE PRACTITIONER

## 2024-11-09 PROCEDURE — 71046 X-RAY EXAM CHEST 2 VIEWS: CPT | Mod: TC | Performed by: NURSE PRACTITIONER

## 2024-11-09 PROCEDURE — 99204 OFFICE O/P NEW MOD 45 MIN: CPT | Performed by: NURSE PRACTITIONER

## 2024-11-09 PROCEDURE — 3074F SYST BP LT 130 MM HG: CPT | Performed by: NURSE PRACTITIONER

## 2024-11-09 RX ORDER — IPRATROPIUM BROMIDE 17 UG/1
2 AEROSOL, METERED RESPIRATORY (INHALATION) EVERY 4 HOURS PRN
Qty: 12.9 G | Refills: 0 | Status: SHIPPED | OUTPATIENT
Start: 2024-11-09

## 2024-11-09 RX ORDER — MULTIVITAMIN
1 TABLET ORAL DAILY
COMMUNITY

## 2024-11-09 RX ORDER — PREDNISONE 20 MG/1
40 TABLET ORAL DAILY
Qty: 10 TABLET | Refills: 0 | Status: SHIPPED | OUTPATIENT
Start: 2024-11-09 | End: 2024-11-14

## 2024-11-09 RX ORDER — BUPROPION HYDROCHLORIDE 300 MG/1
300 TABLET ORAL EVERY MORNING
COMMUNITY
Start: 2024-06-20

## 2024-11-09 RX ORDER — BENZONATATE 200 MG/1
200 CAPSULE ORAL EVERY 8 HOURS PRN
Qty: 30 CAPSULE | Refills: 0 | Status: SHIPPED | OUTPATIENT
Start: 2024-11-09

## 2024-11-09 RX ORDER — SEMAGLUTIDE 0.68 MG/ML
INJECTION, SOLUTION SUBCUTANEOUS
COMMUNITY

## 2024-11-09 ASSESSMENT — ENCOUNTER SYMPTOMS
SHORTNESS OF BREATH: 0
COUGH: 1
SORE THROAT: 1
BACK PAIN: 0
FEVER: 0
SPUTUM PRODUCTION: 1
WHEEZING: 0
ABDOMINAL PAIN: 0
CHILLS: 0

## 2024-11-09 ASSESSMENT — VISUAL ACUITY: OU: 1
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
## 2024-11-09 NOTE — PATIENT INSTRUCTIONS
Details    Reading Physician Reading Date Result Priority   Kris Berman M.D.  [phone] 11/9/2024      Narrative & Impression     11/9/2024 9:32 AM     HISTORY/REASON FOR EXAM:  Cough     TECHNIQUE/EXAM DESCRIPTION: Two views of the chest.     COMPARISON:  None.     FINDINGS: No pneumonia or suspicious lung abnormality. No pleural effusion or pneumothorax. No suspicious bone abnormality or displaced rib fracture evident.        IMPRESSION:  Impression:     1. No active cardiac or pulmonary disease evident.              Exam Ended: 11/09/24  9:41 AM Last Resulted: 11/09/24  9:50 AM

## 2024-12-06 ENCOUNTER — PATIENT MESSAGE (OUTPATIENT)
Dept: MEDICAL GROUP | Facility: MEDICAL CENTER | Age: 29
End: 2024-12-06

## 2024-12-06 ENCOUNTER — HOSPITAL ENCOUNTER (OUTPATIENT)
Dept: LAB | Facility: MEDICAL CENTER | Age: 29
End: 2024-12-06
Attending: FAMILY MEDICINE
Payer: COMMERCIAL

## 2024-12-06 ENCOUNTER — OFFICE VISIT (OUTPATIENT)
Dept: MEDICAL GROUP | Facility: MEDICAL CENTER | Age: 29
End: 2024-12-06
Payer: COMMERCIAL

## 2024-12-06 VITALS
DIASTOLIC BLOOD PRESSURE: 76 MMHG | HEART RATE: 65 BPM | SYSTOLIC BLOOD PRESSURE: 124 MMHG | HEIGHT: 68 IN | TEMPERATURE: 97.3 F | WEIGHT: 245 LBS | BODY MASS INDEX: 37.13 KG/M2 | OXYGEN SATURATION: 98 %

## 2024-12-06 DIAGNOSIS — F32.A ANXIETY AND DEPRESSION: ICD-10-CM

## 2024-12-06 DIAGNOSIS — E66.812 CLASS 2 SEVERE OBESITY DUE TO EXCESS CALORIES WITH SERIOUS COMORBIDITY AND BODY MASS INDEX (BMI) OF 37.0 TO 37.9 IN ADULT (HCC): ICD-10-CM

## 2024-12-06 DIAGNOSIS — R11.0 NAUSEA: ICD-10-CM

## 2024-12-06 DIAGNOSIS — G47.33 OSA (OBSTRUCTIVE SLEEP APNEA): ICD-10-CM

## 2024-12-06 DIAGNOSIS — Z00.00 ENCOUNTER FOR PREVENTATIVE ADULT HEALTH CARE EXAMINATION: Primary | ICD-10-CM

## 2024-12-06 DIAGNOSIS — Z11.3 SCREENING EXAMINATION FOR STD (SEXUALLY TRANSMITTED DISEASE): ICD-10-CM

## 2024-12-06 DIAGNOSIS — F41.9 ANXIETY AND DEPRESSION: ICD-10-CM

## 2024-12-06 DIAGNOSIS — Z11.4 ENCOUNTER FOR SCREENING FOR HIV: ICD-10-CM

## 2024-12-06 DIAGNOSIS — Z23 IMMUNIZATION DUE: ICD-10-CM

## 2024-12-06 DIAGNOSIS — E28.2 PCOS (POLYCYSTIC OVARIAN SYNDROME): ICD-10-CM

## 2024-12-06 DIAGNOSIS — J45.990 EXERCISE-INDUCED ASTHMA: ICD-10-CM

## 2024-12-06 DIAGNOSIS — Z11.59 ENCOUNTER FOR HEPATITIS C SCREENING TEST FOR LOW RISK PATIENT: ICD-10-CM

## 2024-12-06 DIAGNOSIS — Z13.29 THYROID DISORDER SCREENING: ICD-10-CM

## 2024-12-06 DIAGNOSIS — E66.01 CLASS 2 SEVERE OBESITY DUE TO EXCESS CALORIES WITH SERIOUS COMORBIDITY AND BODY MASS INDEX (BMI) OF 37.0 TO 37.9 IN ADULT (HCC): ICD-10-CM

## 2024-12-06 PROBLEM — M65.4 DE QUERVAIN'S TENOSYNOVITIS: Status: ACTIVE | Noted: 2024-04-11

## 2024-12-06 LAB
ALBUMIN SERPL BCP-MCNC: 4.2 G/DL (ref 3.2–4.9)
ALBUMIN/GLOB SERPL: 1.3 G/DL
ALP SERPL-CCNC: 101 U/L (ref 30–99)
ALT SERPL-CCNC: 21 U/L (ref 2–50)
ANION GAP SERPL CALC-SCNC: 8 MMOL/L (ref 7–16)
AST SERPL-CCNC: 22 U/L (ref 12–45)
BILIRUB SERPL-MCNC: 0.4 MG/DL (ref 0.1–1.5)
BUN SERPL-MCNC: 8 MG/DL (ref 8–22)
CALCIUM ALBUM COR SERPL-MCNC: 9 MG/DL (ref 8.5–10.5)
CALCIUM SERPL-MCNC: 9.2 MG/DL (ref 8.4–10.2)
CHLORIDE SERPL-SCNC: 105 MMOL/L (ref 96–112)
CHOLEST SERPL-MCNC: 144 MG/DL (ref 100–199)
CO2 SERPL-SCNC: 26 MMOL/L (ref 20–33)
CREAT SERPL-MCNC: 0.67 MG/DL (ref 0.5–1.4)
ERYTHROCYTE [DISTWIDTH] IN BLOOD BY AUTOMATED COUNT: 42.5 FL (ref 35.9–50)
FASTING STATUS PATIENT QL REPORTED: NORMAL
GFR SERPLBLD CREATININE-BSD FMLA CKD-EPI: 120 ML/MIN/1.73 M 2
GLOBULIN SER CALC-MCNC: 3.2 G/DL (ref 1.9–3.5)
GLUCOSE SERPL-MCNC: 79 MG/DL (ref 65–99)
HCT VFR BLD AUTO: 40.8 % (ref 37–47)
HCV AB SER QL: NORMAL
HDLC SERPL-MCNC: 64 MG/DL
HGB BLD-MCNC: 13.5 G/DL (ref 12–16)
HIV 1+2 AB+HIV1 P24 AG SERPL QL IA: NORMAL
LDLC SERPL CALC-MCNC: 67 MG/DL
MCH RBC QN AUTO: 29.9 PG (ref 27–33)
MCHC RBC AUTO-ENTMCNC: 33.1 G/DL (ref 32.2–35.5)
MCV RBC AUTO: 90.5 FL (ref 81.4–97.8)
PLATELET # BLD AUTO: 380 K/UL (ref 164–446)
PMV BLD AUTO: 11.1 FL (ref 9–12.9)
POTASSIUM SERPL-SCNC: 4.3 MMOL/L (ref 3.6–5.5)
PROT SERPL-MCNC: 7.4 G/DL (ref 6–8.2)
RBC # BLD AUTO: 4.51 M/UL (ref 4.2–5.4)
SODIUM SERPL-SCNC: 139 MMOL/L (ref 135–145)
T PALLIDUM AB SER QL IA: NORMAL
T4 FREE SERPL-MCNC: 1.03 NG/DL (ref 0.93–1.7)
TRIGL SERPL-MCNC: 63 MG/DL (ref 0–149)
TSH SERPL DL<=0.005 MIU/L-ACNC: 1.26 UIU/ML (ref 0.38–5.33)
WBC # BLD AUTO: 8.2 K/UL (ref 4.8–10.8)

## 2024-12-06 PROCEDURE — 87389 HIV-1 AG W/HIV-1&-2 AB AG IA: CPT

## 2024-12-06 PROCEDURE — 86780 TREPONEMA PALLIDUM: CPT

## 2024-12-06 PROCEDURE — 84439 ASSAY OF FREE THYROXINE: CPT

## 2024-12-06 PROCEDURE — 80061 LIPID PANEL: CPT

## 2024-12-06 PROCEDURE — 86803 HEPATITIS C AB TEST: CPT

## 2024-12-06 PROCEDURE — 3074F SYST BP LT 130 MM HG: CPT | Performed by: FAMILY MEDICINE

## 2024-12-06 PROCEDURE — 90677 PCV20 VACCINE IM: CPT | Performed by: FAMILY MEDICINE

## 2024-12-06 PROCEDURE — 3078F DIAST BP <80 MM HG: CPT | Performed by: FAMILY MEDICINE

## 2024-12-06 PROCEDURE — 83036 HEMOGLOBIN GLYCOSYLATED A1C: CPT

## 2024-12-06 PROCEDURE — 80053 COMPREHEN METABOLIC PANEL: CPT

## 2024-12-06 PROCEDURE — 85027 COMPLETE CBC AUTOMATED: CPT

## 2024-12-06 PROCEDURE — 90471 IMMUNIZATION ADMIN: CPT | Performed by: FAMILY MEDICINE

## 2024-12-06 PROCEDURE — 36415 COLL VENOUS BLD VENIPUNCTURE: CPT

## 2024-12-06 PROCEDURE — 99214 OFFICE O/P EST MOD 30 MIN: CPT | Mod: 25 | Performed by: FAMILY MEDICINE

## 2024-12-06 PROCEDURE — 83695 ASSAY OF LIPOPROTEIN(A): CPT

## 2024-12-06 PROCEDURE — 84443 ASSAY THYROID STIM HORMONE: CPT

## 2024-12-06 RX ORDER — TIRZEPATIDE 2.5 MG/.5ML
2.5 INJECTION, SOLUTION SUBCUTANEOUS
Qty: 2 ML | Refills: 0 | Status: SHIPPED | OUTPATIENT
Start: 2024-12-06

## 2024-12-06 RX ORDER — CHORIONIC GONADOTROPIN 10000 UNIT
10000 KIT INTRAMUSCULAR
COMMUNITY
Start: 2024-07-11 | End: 2024-12-06

## 2024-12-06 RX ORDER — LEVONORGESTREL 52 MG/1
INTRAUTERINE DEVICE INTRAUTERINE
COMMUNITY

## 2024-12-06 RX ORDER — MEDROXYPROGESTERONE ACETATE 10 MG
10 TABLET ORAL DAILY
COMMUNITY
Start: 2024-07-14 | End: 2024-12-06

## 2024-12-06 RX ORDER — BUPROPION HYDROCHLORIDE 300 MG/1
300 TABLET ORAL EVERY MORNING
Qty: 90 TABLET | Refills: 3 | Status: SHIPPED | OUTPATIENT
Start: 2024-12-06

## 2024-12-06 RX ORDER — ALBUTEROL SULFATE 90 UG/1
2 INHALANT RESPIRATORY (INHALATION) EVERY 4 HOURS PRN
Qty: 1 EACH | Refills: 11 | Status: SHIPPED | OUTPATIENT
Start: 2024-12-06

## 2024-12-06 RX ORDER — ONDANSETRON 4 MG/1
4 TABLET, ORALLY DISINTEGRATING ORAL EVERY 6 HOURS PRN
Qty: 10 TABLET | Refills: 5 | Status: SHIPPED | OUTPATIENT
Start: 2024-12-06

## 2024-12-06 NOTE — PROGRESS NOTES
Verbal consent was acquired by the patient to use [employer] ambient listening note generation during this visit: Yes      HPI:    Trinity Gilbert is a pleasant 29 y.o. female here to establish care.    History of Present Illness  The patient is a 29-year-old individual here to establish care.    They discontinued semaglutide in 02/2024 for an EGFR cycle, resulting in a 45-pound weight gain over six months. Current weight is 223 pounds. They experienced significant nausea on semaglutide. Requests wellness labs due to recent weight gain.    Mirena IUD in place since fall 2020. Never pregnant. Last Pap smear on 13/11/2023 was normal, negative for HPV.    On Prozac 60 mg and Wellbutrin 300 mg, both need refills. Uses albuterol as needed for exercise-induced asthma, worse in winter. Not using Atrovent, previously prescribed for bronchitis.    Persistent cough, likely due to bronchitis. Chest x-ray three weeks into symptoms showed no pneumonia. Steroid pack from urgent care did not significantly improve symptoms.    History of acne, successfully treated with Accutane. Recurrent De Quervain's tenosynovitis, treated with three steroid injections on both sides. Also has PCOS.    Mild sleep apnea, using CPAP machine with improved snoring. Requests CPAP supplies.    Wellness labs done in fall 2023. Requests tests for Hepatitis C, HIV, and syphilis.    Supplemental Information: Egg retrieval and wisdom teeth extraction.    SOCIAL HISTORY  They do not use electronic cigarettes. They drink alcohol 2 to 3 times a month. They deny any drug use.    FAMILY HISTORY  Mother: depression, anxiety. Father: arthritis. Sister 1: ADHD. Sister 2: anxiety. Maternal grandmother: renal cell cancer, depression, anxiety. Maternal grandfather: recurrent squamous cell carcinoma of the face, hypertension, hypercholesterolemia. Paternal grandmother: multiple sclerosis, bipolar disorder. Paternal grandfather: lung cancer  (non-smoker).    ALLERGIES  They are allergic to AMOXICILLIN.    IMMUNIZATIONS  They have received 2 doses of HPV vaccine.    Current medicines (including changes today)  Current Outpatient Medications   Medication Sig Dispense Refill    Tirzepatide (MOUNJARO) 2.5 MG/0.5ML Solution Auto-injector Inject 2.5 mg under the skin every 7 days. 2 mL 0    ondansetron (ZOFRAN ODT) 4 MG TABLET DISPERSIBLE Take 1 Tablet by mouth every 6 hours as needed for Nausea/Vomiting. 10 Tablet 5    FLUoxetine (PROZAC) 20 MG Cap Take 3 Capsules by mouth every day. 270 Capsule 3    albuterol 108 (90 Base) MCG/ACT Aero Soln inhalation aerosol Inhale 2 Puffs every four hours as needed for Shortness of Breath. 1 Each 11    buPROPion (WELLBUTRIN XL) 300 MG XL tablet Take 1 Tablet by mouth every morning. 90 Tablet 3    Multiple Vitamin (MULTI-VITAMIN) Tab Take 1 Tablet by mouth every day.      levonorgestrel (MIRENA, 52 MG,) 20 MCG/DAY IUD Fall 2020       No current facility-administered medications for this visit.       Past Medical/ Surgical History  She  has a past medical history of Anxiety and depression (03/01/2023), De Quervain's tenosynovitis (04/11/2024), Nausea in adult (03/01/2023), ORESTES (obstructive sleep apnea) (03/01/2023), PCOS (polycystic ovarian syndrome) (12/06/2024), and Positive PPD (01/06/2016).  She  has a past surgical history that includes dental extraction(s) and other.    Social History  Social History     Tobacco Use    Smoking status: Never    Smokeless tobacco: Never   Vaping Use    Vaping status: Never Used   Substance Use Topics    Alcohol use: Yes     Comment: occ 2-3 times a month    Drug use: Never     Social History     Social History Narrative    Not on file        Family History  Family History   Problem Relation Age of Onset    Depression Mother     Anxiety disorder Mother     Arthritis Father     ADD / ADHD Sister     Anxiety disorder Sister     Anxiety disorder Maternal Grandmother     Depression Maternal  "Grandmother     Cancer Maternal Grandmother         kidney, renal cell    Hyperlipidemia Maternal Grandfather     Hypertension Maternal Grandfather     Heart Disease Maternal Grandfather     Cancer Maternal Grandfather         squamous cell skin    Multiple Sclerosis Paternal Grandmother     Bipolar disorder Paternal Grandmother     Cancer Paternal Grandfather         lung?? not a smoker     Family Status   Relation Name Status    Mo  Alive    Fa  Alive    Sis  Alive    Sis  Alive    MGMo  (Not Specified)    MGFa  (Not Specified)    PGMo  (Not Specified)    PGFa  (Not Specified)   No partnership data on file        Objective:     /76   Pulse 65   Temp 36.3 °C (97.3 °F)   Ht 1.727 m (5' 8\")   Wt 111 kg (245 lb)   SpO2 98%  Body mass index is 37.25 kg/m².    Physical Exam  Constitutional:       General: She is not in acute distress.     Appearance: She is obese.   HENT:      Head: Normocephalic and atraumatic.      Right Ear: Tympanic membrane and external ear normal.      Left Ear: Tympanic membrane and external ear normal.      Nose: No nasal deformity.      Mouth/Throat:      Lips: Pink.      Mouth: Mucous membranes are moist.      Pharynx: Oropharynx is clear. Uvula midline. No posterior oropharyngeal erythema.   Eyes:      General: Lids are normal.      Extraocular Movements: Extraocular movements intact.      Conjunctiva/sclera: Conjunctivae normal.      Pupils: Pupils are equal, round, and reactive to light.   Neck:      Trachea: Trachea normal.   Cardiovascular:      Rate and Rhythm: Normal rate and regular rhythm.      Heart sounds: Normal heart sounds. No murmur heard.     No friction rub. No gallop.   Pulmonary:      Effort: Pulmonary effort is normal. No accessory muscle usage.      Breath sounds: Normal breath sounds. No wheezing or rales.   Abdominal:      General: Bowel sounds are normal.      Palpations: Abdomen is soft.      Tenderness: There is no abdominal tenderness.   Musculoskeletal: "      Cervical back: Normal range of motion and neck supple.      Right lower leg: No edema.      Left lower leg: No edema.   Lymphadenopathy:      Cervical: No cervical adenopathy.   Skin:     General: Skin is warm and dry.      Findings: No rash.   Neurological:      General: No focal deficit present.      Mental Status: She is alert and oriented to person, place, and time. Mental status is at baseline.      GCS: GCS eye subscore is 4. GCS verbal subscore is 5. GCS motor subscore is 6.      Motor: No weakness.      Gait: Gait is intact.   Psychiatric:         Attention and Perception: Attention normal.         Mood and Affect: Mood and affect normal.         Speech: Speech normal.          Imaging:  No imaging    Labs:  No updated labs  Assessment and Plan:   The following treatment plan was discussed:     Assessment & Plan  1. Obstructive Sleep Apnea - Chronic stable  - DME order placed for CPAP supplies    2. Class II Severe Obesity - Chronic stable  - Initiating tirzepatide 2.5 mg weekly  - Dietary modifications and increased physical exercise discussed    3. Nausea - Chronic recurring with GLP-1  - Prescribed Zofran 4 mg ODT    4. Anxiety and Depression - Chronic stable  - Continue fluoxetine 60 mg daily, refill sent  - Refill of Wellbutrin 300 mg ER sent    5. Exercise-Induced Asthma - Chronic stable  - Refill of albuterol inhaler sent  - Pneumonia vaccine ordered    6. Polycystic Ovarian Syndrome - Chronic stable  - Mirena in place  - Tirzepatide recommended for weight reduction    7. Health Maintenance - Routine screenings  - Ordered CBC, CMP, GFR, lipid profile, A1c, thyroid studies, syphilis, HIV, and hep C screening    Follow-up  - Scheduled for annual exam in 1 year  Trinity was seen today for establish care, breathing problem and sleep problem.    Diagnoses and all orders for this visit:    Encounter for preventative adult health care examination    ORESTES (obstructive sleep apnea)  -     Tirzepatide  (MOUNJARO) 2.5 MG/0.5ML Solution Auto-injector; Inject 2.5 mg under the skin every 7 days.  -     DME PAP Maintenance    Class 2 severe obesity due to excess calories with serious comorbidity and body mass index (BMI) of 37.0 to 37.9 in adult (HCC)  -     Tirzepatide (MOUNJARO) 2.5 MG/0.5ML Solution Auto-injector; Inject 2.5 mg under the skin every 7 days.    Nausea  -     ondansetron (ZOFRAN ODT) 4 MG TABLET DISPERSIBLE; Take 1 Tablet by mouth every 6 hours as needed for Nausea/Vomiting.    Anxiety and depression  -     FLUoxetine (PROZAC) 20 MG Cap; Take 3 Capsules by mouth every day.  -     buPROPion (WELLBUTRIN XL) 300 MG XL tablet; Take 1 Tablet by mouth every morning.    Exercise-induced asthma  -     albuterol 108 (90 Base) MCG/ACT Aero Soln inhalation aerosol; Inhale 2 Puffs every four hours as needed for Shortness of Breath.    PCOS (polycystic ovarian syndrome)  -     Tirzepatide (MOUNJARO) 2.5 MG/0.5ML Solution Auto-injector; Inject 2.5 mg under the skin every 7 days.  -     CBC WITHOUT DIFFERENTIAL; Future  -     Comp Metabolic Panel; Future  -     ESTIMATED GFR; Future  -     HEMOGLOBIN A1C; Future  -     Lipid Profile; Future  -     Lipoprotein (a); Future    Immunization due  -     Pneumococcal Conjugate Vaccine 20-Valent (6 wks+)    Thyroid disorder screening  -     TSH+FREE T4    Encounter for hepatitis C screening test for low risk patient  -     HEP C VIRUS ANTIBODY; Future    Encounter for screening for HIV  -     HIV AG/AB COMBO ASSAY SCREENING; Future    Screening examination for STD (sexually transmitted disease)  -     RPR (SYPHILIS); Future      Followup: Return in about 1 year (around 12/6/2025) for Annual.    I have placed vaccination orders.  The MA is preforming vaccination orders under the direction of Dr. Waller    Please note that this dictation was created using voice recognition software. I have made every reasonable attempt to correct obvious errors, but I expect that there  are errors of grammar and possibly content that I did not discover before finalizing the note.

## 2024-12-08 LAB — LPA SERPL-MCNC: 18 MG/DL

## 2024-12-09 LAB
EST. AVERAGE GLUCOSE BLD GHB EST-MCNC: 108 MG/DL
HBA1C MFR BLD: 5.4 % (ref 4–5.6)

## 2024-12-27 ENCOUNTER — APPOINTMENT (OUTPATIENT)
Dept: ADMISSIONS | Facility: MEDICAL CENTER | Age: 29
End: 2024-12-27
Attending: ORTHOPAEDIC SURGERY
Payer: COMMERCIAL

## 2025-01-03 ENCOUNTER — PRE-ADMISSION TESTING (OUTPATIENT)
Dept: ADMISSIONS | Facility: MEDICAL CENTER | Age: 30
End: 2025-01-03
Attending: ORTHOPAEDIC SURGERY
Payer: COMMERCIAL

## 2025-01-03 VITALS — WEIGHT: 238.1 LBS | HEIGHT: 68 IN | BODY MASS INDEX: 36.09 KG/M2

## 2025-01-03 ASSESSMENT — FIBROSIS 4 INDEX: FIB4 SCORE: 0.38

## 2025-01-03 NOTE — PREPROCEDURE INSTRUCTIONS
PreAdmit In person Appointment: Reviewed the Preparing for your procedure handout with patient. Patient instructed per pharmacy guidelines regarding taking, holding or contacting provider for instructions on regularly prescribed medications before surgery.    Confirmed with patient where to check in morning of surgery. Handouts/Brochures given to patient.    Will need HCG DOS.

## 2025-01-03 NOTE — PREADMIT AVS NOTE
Current Medications   Medication Instructions    Semaglutide (WEGOVY) 0.5 MG/0.5ML Solution Auto-injector Pen-injector Stop 7 days before surgery    levonorgestrel (MIRENA, 52 MG,) 20 MCG/DAY IUD Follow instructions from surgeon or specialist.    Tirzepatide (MOUNJARO) 2.5 MG/0.5ML Solution Auto-injector Stop 7 days before surgery    ondansetron (ZOFRAN ODT) 4 MG TABLET DISPERSIBLE As needed medication, may take if needed, including morning of procedure     FLUoxetine (PROZAC) 20 MG Cap Continue taking medication as prescribed, including morning of procedure     albuterol 108 (90 Base) MCG/ACT Aero Soln inhalation aerosol As needed medication, may take if needed, including morning of procedure     buPROPion (WELLBUTRIN XL) 300 MG XL tablet Continue taking medication as prescribed, including morning of procedure     Multiple Vitamin (MULTI-VITAMIN) Tab Stop 7 days before surgery

## 2025-01-24 ENCOUNTER — ANESTHESIA (OUTPATIENT)
Dept: SURGERY | Facility: MEDICAL CENTER | Age: 30
End: 2025-01-24
Payer: COMMERCIAL

## 2025-01-24 ENCOUNTER — PHARMACY VISIT (OUTPATIENT)
Dept: PHARMACY | Facility: MEDICAL CENTER | Age: 30
End: 2025-01-24
Payer: COMMERCIAL

## 2025-01-24 ENCOUNTER — HOSPITAL ENCOUNTER (OUTPATIENT)
Facility: MEDICAL CENTER | Age: 30
End: 2025-01-24
Attending: ORTHOPAEDIC SURGERY | Admitting: ORTHOPAEDIC SURGERY
Payer: COMMERCIAL

## 2025-01-24 ENCOUNTER — ANESTHESIA EVENT (OUTPATIENT)
Dept: SURGERY | Facility: MEDICAL CENTER | Age: 30
End: 2025-01-24
Payer: COMMERCIAL

## 2025-01-24 VITALS
TEMPERATURE: 97.5 F | BODY MASS INDEX: 36.74 KG/M2 | HEIGHT: 68 IN | RESPIRATION RATE: 18 BRPM | OXYGEN SATURATION: 93 % | HEART RATE: 98 BPM | SYSTOLIC BLOOD PRESSURE: 131 MMHG | WEIGHT: 242.4 LBS | DIASTOLIC BLOOD PRESSURE: 87 MMHG

## 2025-01-24 DIAGNOSIS — M65.4 DE QUERVAIN'S TENOSYNOVITIS: ICD-10-CM

## 2025-01-24 LAB — HCG UR QL: NEGATIVE

## 2025-01-24 PROCEDURE — 160028 HCHG SURGERY MINUTES - 1ST 30 MINS LEVEL 3: Performed by: ORTHOPAEDIC SURGERY

## 2025-01-24 PROCEDURE — 81025 URINE PREGNANCY TEST: CPT

## 2025-01-24 PROCEDURE — 700101 HCHG RX REV CODE 250: Performed by: STUDENT IN AN ORGANIZED HEALTH CARE EDUCATION/TRAINING PROGRAM

## 2025-01-24 PROCEDURE — 160035 HCHG PACU - 1ST 60 MINS PHASE I: Performed by: ORTHOPAEDIC SURGERY

## 2025-01-24 PROCEDURE — 160048 HCHG OR STATISTICAL LEVEL 1-5: Performed by: ORTHOPAEDIC SURGERY

## 2025-01-24 PROCEDURE — 700105 HCHG RX REV CODE 258: Performed by: ORTHOPAEDIC SURGERY

## 2025-01-24 PROCEDURE — A9270 NON-COVERED ITEM OR SERVICE: HCPCS | Performed by: STUDENT IN AN ORGANIZED HEALTH CARE EDUCATION/TRAINING PROGRAM

## 2025-01-24 PROCEDURE — 160036 HCHG PACU - EA ADDL 30 MINS PHASE I: Performed by: ORTHOPAEDIC SURGERY

## 2025-01-24 PROCEDURE — 160046 HCHG PACU - 1ST 60 MINS PHASE II: Performed by: ORTHOPAEDIC SURGERY

## 2025-01-24 PROCEDURE — 700101 HCHG RX REV CODE 250: Performed by: ORTHOPAEDIC SURGERY

## 2025-01-24 PROCEDURE — 160009 HCHG ANES TIME/MIN: Performed by: ORTHOPAEDIC SURGERY

## 2025-01-24 PROCEDURE — 700111 HCHG RX REV CODE 636 W/ 250 OVERRIDE (IP): Performed by: ORTHOPAEDIC SURGERY

## 2025-01-24 PROCEDURE — 700111 HCHG RX REV CODE 636 W/ 250 OVERRIDE (IP): Mod: JZ | Performed by: STUDENT IN AN ORGANIZED HEALTH CARE EDUCATION/TRAINING PROGRAM

## 2025-01-24 PROCEDURE — RXMED WILLOW AMBULATORY MEDICATION CHARGE: Performed by: ORTHOPAEDIC SURGERY

## 2025-01-24 PROCEDURE — 700102 HCHG RX REV CODE 250 W/ 637 OVERRIDE(OP): Performed by: STUDENT IN AN ORGANIZED HEALTH CARE EDUCATION/TRAINING PROGRAM

## 2025-01-24 PROCEDURE — 160025 RECOVERY II MINUTES (STATS): Performed by: ORTHOPAEDIC SURGERY

## 2025-01-24 PROCEDURE — 160039 HCHG SURGERY MINUTES - EA ADDL 1 MIN LEVEL 3: Performed by: ORTHOPAEDIC SURGERY

## 2025-01-24 PROCEDURE — 700111 HCHG RX REV CODE 636 W/ 250 OVERRIDE (IP)

## 2025-01-24 PROCEDURE — 160002 HCHG RECOVERY MINUTES (STAT): Performed by: ORTHOPAEDIC SURGERY

## 2025-01-24 RX ORDER — DEXAMETHASONE SODIUM PHOSPHATE 4 MG/ML
INJECTION, SOLUTION INTRA-ARTICULAR; INTRALESIONAL; INTRAMUSCULAR; INTRAVENOUS; SOFT TISSUE PRN
Status: DISCONTINUED | OUTPATIENT
Start: 2025-01-24 | End: 2025-01-24 | Stop reason: SURG

## 2025-01-24 RX ORDER — EPHEDRINE SULFATE 50 MG/ML
5 INJECTION, SOLUTION INTRAVENOUS
Status: DISCONTINUED | OUTPATIENT
Start: 2025-01-24 | End: 2025-01-24 | Stop reason: HOSPADM

## 2025-01-24 RX ORDER — CEFAZOLIN SODIUM 2 G/100ML
INJECTION, SOLUTION INTRAVENOUS PRN
Status: DISCONTINUED | OUTPATIENT
Start: 2025-01-24 | End: 2025-01-24 | Stop reason: SURG

## 2025-01-24 RX ORDER — HALOPERIDOL 5 MG/ML
1 INJECTION INTRAMUSCULAR
Status: DISCONTINUED | OUTPATIENT
Start: 2025-01-24 | End: 2025-01-24 | Stop reason: HOSPADM

## 2025-01-24 RX ORDER — TRAMADOL HYDROCHLORIDE 50 MG/1
50 TABLET ORAL EVERY 6 HOURS PRN
Qty: 12 TABLET | Refills: 0 | Status: SHIPPED | OUTPATIENT
Start: 2025-01-24 | End: 2025-01-27

## 2025-01-24 RX ORDER — METOPROLOL TARTRATE 1 MG/ML
1 INJECTION, SOLUTION INTRAVENOUS
Status: DISCONTINUED | OUTPATIENT
Start: 2025-01-24 | End: 2025-01-24 | Stop reason: HOSPADM

## 2025-01-24 RX ORDER — HYDRALAZINE HYDROCHLORIDE 20 MG/ML
5 INJECTION INTRAMUSCULAR; INTRAVENOUS
Status: DISCONTINUED | OUTPATIENT
Start: 2025-01-24 | End: 2025-01-24 | Stop reason: HOSPADM

## 2025-01-24 RX ORDER — OXYCODONE HCL 5 MG/5 ML
5 SOLUTION, ORAL ORAL
Status: COMPLETED | OUTPATIENT
Start: 2025-01-24 | End: 2025-01-24

## 2025-01-24 RX ORDER — ONDANSETRON 2 MG/ML
4 INJECTION INTRAMUSCULAR; INTRAVENOUS
Status: DISCONTINUED | OUTPATIENT
Start: 2025-01-24 | End: 2025-01-24 | Stop reason: HOSPADM

## 2025-01-24 RX ORDER — LABETALOL HYDROCHLORIDE 5 MG/ML
5 INJECTION, SOLUTION INTRAVENOUS
Status: DISCONTINUED | OUTPATIENT
Start: 2025-01-24 | End: 2025-01-24 | Stop reason: HOSPADM

## 2025-01-24 RX ORDER — SODIUM CHLORIDE, SODIUM LACTATE, POTASSIUM CHLORIDE, CALCIUM CHLORIDE 600; 310; 30; 20 MG/100ML; MG/100ML; MG/100ML; MG/100ML
INJECTION, SOLUTION INTRAVENOUS CONTINUOUS
Status: DISCONTINUED | OUTPATIENT
Start: 2025-01-24 | End: 2025-01-24 | Stop reason: HOSPADM

## 2025-01-24 RX ORDER — ONDANSETRON 2 MG/ML
INJECTION INTRAMUSCULAR; INTRAVENOUS PRN
Status: DISCONTINUED | OUTPATIENT
Start: 2025-01-24 | End: 2025-01-24 | Stop reason: SURG

## 2025-01-24 RX ORDER — DIPHENHYDRAMINE HYDROCHLORIDE 50 MG/ML
12.5 INJECTION INTRAMUSCULAR; INTRAVENOUS
Status: DISCONTINUED | OUTPATIENT
Start: 2025-01-24 | End: 2025-01-24 | Stop reason: HOSPADM

## 2025-01-24 RX ORDER — OXYCODONE HCL 5 MG/5 ML
10 SOLUTION, ORAL ORAL
Status: COMPLETED | OUTPATIENT
Start: 2025-01-24 | End: 2025-01-24

## 2025-01-24 RX ORDER — BUPIVACAINE HYDROCHLORIDE 5 MG/ML
INJECTION, SOLUTION EPIDURAL; INTRACAUDAL
Status: DISCONTINUED | OUTPATIENT
Start: 2025-01-24 | End: 2025-01-24 | Stop reason: HOSPADM

## 2025-01-24 RX ORDER — LIDOCAINE HYDROCHLORIDE 20 MG/ML
INJECTION, SOLUTION EPIDURAL; INFILTRATION; INTRACAUDAL; PERINEURAL PRN
Status: DISCONTINUED | OUTPATIENT
Start: 2025-01-24 | End: 2025-01-24 | Stop reason: SURG

## 2025-01-24 RX ADMIN — CEFAZOLIN SODIUM 2 G: 2 INJECTION, SOLUTION INTRAVENOUS at 12:30

## 2025-01-24 RX ADMIN — GLYCOPYRROLATE 0.2 MG: 0.2 INJECTION INTRAMUSCULAR; INTRAVENOUS at 12:49

## 2025-01-24 RX ADMIN — PROPOFOL 200 MG: 10 INJECTION, EMULSION INTRAVENOUS at 12:31

## 2025-01-24 RX ADMIN — FENTANYL CITRATE 50 MCG: 50 INJECTION, SOLUTION INTRAMUSCULAR; INTRAVENOUS at 12:40

## 2025-01-24 RX ADMIN — OXYCODONE HYDROCHLORIDE 5 MG: 5 SOLUTION ORAL at 13:39

## 2025-01-24 RX ADMIN — ONDANSETRON 4 MG: 2 INJECTION INTRAMUSCULAR; INTRAVENOUS at 12:31

## 2025-01-24 RX ADMIN — FENTANYL CITRATE 50 MCG: 50 INJECTION, SOLUTION INTRAMUSCULAR; INTRAVENOUS at 12:31

## 2025-01-24 RX ADMIN — DEXAMETHASONE SODIUM PHOSPHATE 4 MG: 4 INJECTION INTRA-ARTICULAR; INTRALESIONAL; INTRAMUSCULAR; INTRAVENOUS; SOFT TISSUE at 12:31

## 2025-01-24 RX ADMIN — LIDOCAINE HYDROCHLORIDE 80 MG: 20 INJECTION, SOLUTION EPIDURAL; INFILTRATION; INTRACAUDAL; PERINEURAL at 12:31

## 2025-01-24 RX ADMIN — SODIUM CHLORIDE, POTASSIUM CHLORIDE, SODIUM LACTATE AND CALCIUM CHLORIDE: 600; 310; 30; 20 INJECTION, SOLUTION INTRAVENOUS at 12:00

## 2025-01-24 ASSESSMENT — PAIN DESCRIPTION - PAIN TYPE
TYPE: SURGICAL PAIN
TYPE: SURGICAL PAIN

## 2025-01-24 ASSESSMENT — FIBROSIS 4 INDEX: FIB4 SCORE: 0.38

## 2025-01-24 NOTE — OP REPORT
OP Note     PreOp Diagnosis: Left de Quervain's disease        PostOp Diagnosis: Left de Quervain's disease        Procedure(s):  LEFT FIRST DORSAL COMPARTMENT RELEASE - Wound Class: Clean     Surgeon(s):  Kapil Malik M.D.     Anesthesiologist/Type of Anesthesia:  Anesthesiologist: Jocelin Vernon M.D./General     Surgical Staff:  Circulator: Mariel Moraes R.N.; Swapnil Lopez R.N.  Scrub Person: Martha Alejandro     Specimens removed if any:  * No specimens in log *     Estimated Blood Loss: Less than 10 cc     Findings: Thickened extensor retinaculum, 2 abductor pollicis longus slips, 1 extensor pollicis brevis slip     Complications: None     Operative indications: Trinity is a 29-year-old female with left de Quervain's disease greater than right de Quervain's disease.  She has failed conservative management including bracing and injections.  Therefore I discussed with her a first dorsal compartment release.  Risks and benefits of the procedure including but not limited to damage to surrounding nerves, arteries, veins, infection, recurrence, incomplete symptom relief, need for reoperation.  Despite these risks the patient did consent.     Operation in detail: On 1/24/2025 the patient was seen the preoperative area.  Her left wrist was marked all of her questions were answered consent was obtained.  The patient was brought to the operating placed in the supine position.  General anesthetic was administered.  Perioperative Ancef was given.  Formal timeout was performed identifying the left wrist correct wrist as well as correct procedure.  Left arm was prepped marked and draped in the normal sterile fashion.  The limb was elevated exsanguinated with an Esmarch tourniquet inflated to 250 mmHg.  Using a longitudinal 3 cm incision the skin was incised over the first dorsal compartment.  Full-thickness flaps were The subcutaneous tissue.  Care was taken to identify and protect superficial nerves.  The  extensor retinaculum was released longitudinally.  Multiple slips were found of the abductor pollicis longus and one of the extensor pollicis brevis.  No subcompartments identified.  The wound was copiously irrigated.  The tourniquet was released and hemostasis obtained with bipolar cautery.  Skin closed with 3-0 Monocryl 4-0 nylon interrupted sutures.  10 cc 1% lidocaine half percent bupivacaine with epinephrine was injected over the incision site.  A sterile dressing and a thumb spica splint applied.  Patient tolerated the procedure well.  She is brought recovery room in stable condition.  She will follow-up with me in 2 weeks for reassessment.

## 2025-01-24 NOTE — ANESTHESIA PROCEDURE NOTES
Airway    Date/Time: 1/24/2025 12:33 PM    Performed by: Jocelin Vernon M.D.  Authorized by: Jocelin Vernon M.D.    Location:  OR  Urgency:  Elective  Indications for Airway Management:  Anesthesia      Spontaneous Ventilation: absent    Sedation Level:  Deep  Preoxygenated: Yes    Mask Difficulty Assessment:  1 - vent by mask  Final Airway Type:  Supraglottic airway  Final Supraglottic Airway:  Standard LMA    SGA Size:  3  Number of Attempts at Approach:  1

## 2025-01-24 NOTE — OR SURGEON
OP Note    PreOp Diagnosis: Left de Quervain's disease      PostOp Diagnosis: Left de Quervain's disease      Procedure(s):  LEFT FIRST DORSAL COMPARTMENT RELEASE - Wound Class: Clean    Surgeon(s):  Kapil Malik M.D.    Anesthesiologist/Type of Anesthesia:  Anesthesiologist: Jocelin Vernon M.D./General    Surgical Staff:  Circulator: Mariel Moraes R.N.; Swapnil Lopez R.N.  Scrub Person: Martha Alejandro    Specimens removed if any:  * No specimens in log *    Estimated Blood Loss: Less than 10 cc    Findings: Thickened extensor retinaculum, 2 abductor pollicis longus slips, 1 extensor pollicis brevis slip    Complications: None    Operative indications: Trinity is a 29-year-old female with left de Quervain's disease greater than right de Quervain's disease.  She has failed conservative management including bracing and injections.  Therefore I discussed with her a first dorsal compartment release.  Risks and benefits of the procedure including but not limited to damage to surrounding nerves, arteries, veins, infection, recurrence, incomplete symptom relief, need for reoperation.  Despite these risks the patient did consent.    Operation in detail: On 1/24/2025 the patient was seen the preoperative area.  Her left wrist was marked all of her questions were answered consent was obtained.  The patient was brought to the operating placed in the supine position.  General anesthetic was administered.  Perioperative Ancef was given.  Formal timeout was performed identifying the left wrist correct wrist as well as correct procedure.  Left arm was prepped marked and draped in the normal sterile fashion.  The limb was elevated exsanguinated with an Esmarch tourniquet inflated to 250 mmHg.  Using a longitudinal 3 cm incision the skin was incised over the first dorsal compartment.  Full-thickness flaps were The subcutaneous tissue.  Care was taken to identify and protect superficial nerves.  The extensor  retinaculum was released longitudinally.  Multiple slips were found of the abductor pollicis longus and one of the extensor pollicis brevis.  No subcompartments identified.  The wound was copiously irrigated.  The tourniquet was released and hemostasis obtained with bipolar cautery.  Skin closed with 3-0 Monocryl 4-0 nylon interrupted sutures.  10 cc 1% lidocaine half percent bupivacaine with epinephrine was injected over the incision site.  A sterile dressing and a thumb spica splint applied.  Patient tolerated the procedure well.  She is brought recovery room in stable condition.  She will follow-up with me in 2 weeks for reassessment.    1/24/2025 1:08 PM Kapil Malik M.D.

## 2025-01-24 NOTE — OR NURSING
Procedure, patient allergies, and NPO status verified. Home med rec completed and belongings secured. Patient verbalizes understanding of pain scale, expected course of stay, and plan of care. Surgical site verified with pt, IV access established.

## 2025-01-24 NOTE — DISCHARGE INSTRUCTIONS
HOME CARE INSTRUCTIONS    ACTIVITY: Rest and take it easy for the first 24 hours.  A responsible adult is recommended to remain with you during that time.  It is normal to feel sleepy.  We encourage you to not do anything that requires balance, judgment or coordination.    FOR 24 HOURS DO NOT:  Drive, operate machinery or run household appliances.  Drink beer or alcoholic beverages.  Make important decisions or sign legal documents.    SPECIAL INSTRUCTIONS: Keep dressing clean, dry and intact until your MD gives your further instructions.      DIET: To avoid nausea, slowly advance diet as tolerated, avoiding spicy or greasy foods for the first day.  Add more substantial food to your diet according to your physician's instructions.  Babies can be fed formula or breast milk as soon as they are hungry.  INCREASE FLUIDS AND FIBER TO AVOID CONSTIPATION.    SURGICAL DRESSING/BATHING: OK to shower tomorrow.  Have someone help to cover your hand with a plastic bag, tape up-top to ensure that the dressing stays dry.      MEDICATIONS: Resume taking daily medication.  Take prescribed pain medication with food.  If no medication is prescribed, you may take non-aspirin pain medication if needed.  PAIN MEDICATION CAN BE VERY CONSTIPATING.  Take a stool softener or laxative such as senokot, pericolace, or milk of magnesia if needed.    Prescription given for Tramadol.  Last pain medication given: You had 5mg Oxycodone at 1:40pm.    A follow-up appointment should be arranged with your doctor; call to schedule.    You should CALL YOUR PHYSICIAN if you develop:  Fever greater than 101 degrees F.  Pain not relieved by medication, or persistent nausea or vomiting.  Excessive bleeding (blood soaking through dressing) or unexpected drainage from the wound.  Extreme redness or swelling around the incision site, drainage of pus or foul smelling drainage.  Inability to urinate or empty your bladder within 8 hours.  Problems with breathing  or chest pain.    You should call 911 if you develop problems with breathing or chest pain.  If you are unable to contact your doctor or surgical center, you should go to the nearest emergency room or urgent care center.  Physician's telephone #: Dr. Malik [phone]    MILD FLU-LIKE SYMPTOMS ARE NORMAL.  YOU MAY EXPERIENCE GENERALIZED MUSCLE ACHES, THROAT IRRITATION, HEADACHE AND/OR SOME NAUSEA.    If any questions arise, call your doctor.  If your doctor is not available, please feel free to call the Surgical Center at [phone].      A registered nurse may call you a few days after your surgery to see how you are doing after your procedure.    You may also receive a survey in the mail within the next two weeks and we ask that you take a few moments to complete the survey and return it to us.  Our goal is to provide you with very good care and we value your comments.     Depression / Suicide Risk    As you are discharged from this [facility] facility, it is important to learn how to keep safe from harming yourself.    Recognize the warning signs:  Abrupt changes in personality, positive or negative- including increase in energy   Giving away possessions  Change in eating patterns- significant weight changes-  positive or negative  Change in sleeping patterns- unable to sleep or sleeping all the time   Unwillingness or inability to communicate  Depression  Unusual sadness, discouragement and loneliness  Talk of wanting to die  Neglect of personal appearance   Rebelliousness- reckless behavior  Withdrawal from people/activities they love  Confusion- inability to concentrate     If you or a loved one observes any of these behaviors or has concerns about self-harm, here's what you can do:  Talk about it- your feelings and reasons for harming yourself  Remove any means that you might use to hurt yourself (examples: pills, rope, extension cords, firearm)  Get professional help from the community (Mental  Health, Substance Abuse, psychological counseling)  Do not be alone:Call your Safe Contact- someone whom you trust who will be there for you.  Call your local CRISIS HOTLINE 851-1701 or 990-019-4686  Call your local Children's Mobile Crisis Response Team [place] (725) 544-9912 or www.[employer]MIX (?????)  Call the toll free National Suicide Prevention Hotlines   National Suicide Prevention Lifeline 792-842-LNRJ (5354)  [facility] Line Network 800-SUICIDE (250-7187)    I acknowledge receipt and understanding of these Home Care instructions.

## 2025-01-24 NOTE — ANESTHESIA POSTPROCEDURE EVALUATION
Patient: Trinity Gilbert    Procedure Summary       Date: 01/24/25 Room / Location:  OR 02 / SURGERY [facility]    Anesthesia Start: 1228 Anesthesia Stop: 1318    Procedure: LEFT FIRST DORSAL COMPARTMENT RELEASE (Left: Hand) Diagnosis: (RDIAL STYLOID TENOSYNOVITIS)    Surgeons: Kapil Malik M.D. Responsible Provider: Jocelin Vernon M.D.    Anesthesia Type: MAC ASA Status: 2            Final Anesthesia Type: MAC  Last vitals  BP   Blood Pressure: (!) 140/85    Temp   36.6 °C (97.9 °F)    Pulse   (!) 101   Resp   (!) 21    SpO2   100 %      Anesthesia Post Evaluation    Patient location during evaluation: PACU  Patient participation: complete - patient participated  Level of consciousness: awake and alert    Airway patency: patent  Anesthetic complications: no  Cardiovascular status: hemodynamically stable  Respiratory status: acceptable  Hydration status: euvolemic    PONV: none          There were no known notable events for this encounter.

## 2025-01-24 NOTE — ANESTHESIA TIME REPORT
Anesthesia Start and Stop Event Times       Date Time Event    1/24/2025 1027 Ready for Procedure     1228 Anesthesia Start     1318 Anesthesia Stop          Responsible Staff  01/24/25      Name Role Begin End    Jocelin Vernon M.D. Anesth 1228 1318          Overtime Reason:  no overtime (within assigned shift)    Comments:

## 2025-01-24 NOTE — OR NURSING
1303- pt arrived to PACU, report received.  Pt on 8L mask with OPA in place.  L hand with dressing, CDI.  Ice pack provided, elevated on a pillow.  Pulses palpable through dressing.  Brisk refill observed.      1307- OPA dc.  Pt tolerating sips of water at this time.     1339- pt states pain to L arm 4/10.  Medicated per MAR.     1411- Report called to phase 2.  Per PAVEL Wallace to waive the one hour positive stop-bang here in PACU. Meds- to-beds called. Pt transferred over.

## 2025-01-24 NOTE — ANESTHESIA PREPROCEDURE EVALUATION
Case: [id number] Date/Time: 01/24/25 1215    Procedure: LEFT FIRST DORSAL COMPARTMENT RELEASE    Pre-op diagnosis: RDIAL STYLOID TENOSYNOVITIS    Location:  OR 02 / SURGERY [facility]    Surgeons: Kapil Malik M.D.            Relevant Problems   ANESTHESIA   (positive) ORESTES (obstructive sleep apnea)      Other   (positive) Anxiety and depression   (positive) De Quervain's tenosynovitis       Physical Exam    Airway   Mallampati: II  TM distance: >3 FB  Neck ROM: full       Cardiovascular - normal exam  Rhythm: regular  Rate: normal     Dental - normal exam           Pulmonary - normal exam  Breath sounds clear to auscultation     Abdominal    Neurological - normal exam                   Anesthesia Plan    ASA 2       Plan - MAC             Plan Factors:   Patient was previously instructed to abstain from smoking on day of procedure.  Patient did not smoke on day of procedure.      Induction: intravenous    Postoperative Plan: Postoperative administration of opioids is intended.    Pertinent diagnostic labs and testing reviewed    Informed Consent:    Anesthetic plan and risks discussed with patient.    Use of blood products discussed with: patient whom consented to blood products.

## 2025-01-25 NOTE — OR NURSING
1411-Patient arrived to phase 2. Patient changed out of surgical gown into clothes. Patient is A&Ox4. Patient reports tolerable pain and no nausea. Vital signs taken and patient assessed.     1425- Discharge instructions read. All questions answered. Meds to beds delivered to the patient.    1440- IV removed. CNA transported the patient to her ride via wheelchair. Discharged to care of responsible adult. All belongings with the patient.

## (undated) DEVICE — ELECTRODE DUAL RETURN W/ CORD - (50/PK)

## (undated) DEVICE — PADDING CAST 4 IN STERILE - 4 X 4 YDS (24/CA)

## (undated) DEVICE — TOWEL STOP TIMEOUT SAFETY FLAG (40EA/CA)

## (undated) DEVICE — CHLORAPREP 26 ML APPLICATOR - ORANGE TINT(25/CA)

## (undated) DEVICE — SUTURE 4-0 MONOCRYL PLUS PS-2 - 27 INCH (36/BX)

## (undated) DEVICE — COVER LIGHT HANDLE FLEXIBLE - SOFT (2EA/PK 80PK/CA)

## (undated) DEVICE — PAD PREP 24 X 48 CUFFED - (100/CA)

## (undated) DEVICE — SUTURE GENERAL

## (undated) DEVICE — PACK UPPER EXTREMITY SM OR - (3/CA)

## (undated) DEVICE — STOCKINET BIAS 4 IN STERILE - (20/CA)

## (undated) DEVICE — SUCTION INSTRUMENT YANKAUER BULBOUS TIP W/O VENT (50EA/CA)

## (undated) DEVICE — SET EXTENSION WITH 2 PORTS (48EA/CA) ***PART #2C8610 IS A SUBSTITUTE*****

## (undated) DEVICE — LACTATED RINGERS INJ 1000 ML - (14EA/CA 60CA/PF)

## (undated) DEVICE — SENSOR OXIMETER ADULT SPO2 RD SET (20EA/BX)

## (undated) DEVICE — SUTURE 4-0 ETHILON PS-2 18 (12PK/BX)"

## (undated) DEVICE — TUBE CONNECTING SUCTION - CLEAR PLASTIC STERILE 72 IN (50EA/CA)

## (undated) DEVICE — SODIUM CHL IRRIGATION 0.9% 1000ML (12EA/CA)

## (undated) DEVICE — TUBING CLEARLINK DUO-VENT - C-FLO (48EA/CA)

## (undated) DEVICE — CANISTER SUCTION 3000ML MECHANICAL FILTER AUTO SHUTOFF MEDI-VAC NONSTERILE LF DISP (40EA/CA)